# Patient Record
Sex: FEMALE | Race: WHITE | NOT HISPANIC OR LATINO | Employment: FULL TIME | URBAN - METROPOLITAN AREA
[De-identification: names, ages, dates, MRNs, and addresses within clinical notes are randomized per-mention and may not be internally consistent; named-entity substitution may affect disease eponyms.]

---

## 2022-04-18 LAB
D AG BLD QL: NEGATIVE
EXTERNAL ABO: NORMAL
HBV SURFACE AG SER QL: NONREACTIVE
HIV 1+2 AB+HIV1 P24 AG SERPL QL IA: NONREACTIVE
QST CHLAMYDIA TRACHOMATIS RNA, TMA: NEGATIVE
QST NEISSERIA GONORRHOEAE RNA, TMA: NEGATIVE
RPR SER QL: NORMAL
RUBELLA IGG SCREEN: NORMAL
VZV IGG SER-ACNC: 1038 AU/ML (ref ?–99)

## 2022-06-18 LAB — GP B STREP SPEC QL CULT: NORMAL

## 2022-06-27 ENCOUNTER — TELEPHONE (OUTPATIENT)
Dept: SCHEDULING | Facility: CLINIC | Age: 32
End: 2022-06-27
Payer: COMMERCIAL

## 2022-06-27 NOTE — TELEPHONE ENCOUNTER
New Patient Appointment Request     Name of caller Adelia Murry     Reason for Visit: heart palpitations 37 weeks pregnant      Insurance: Personal choice       Insurance ID #: [id number]      Recent Procedures:none     Referred by:OB     Previous Cardiologist name and phone number:pt doesn't remember     Best contact number: [phone]

## 2022-07-05 ENCOUNTER — OFFICE VISIT (OUTPATIENT)
Dept: CARDIOLOGY | Facility: CLINIC | Age: 32
End: 2022-07-05
Payer: COMMERCIAL

## 2022-07-05 VITALS
DIASTOLIC BLOOD PRESSURE: 80 MMHG | SYSTOLIC BLOOD PRESSURE: 110 MMHG | BODY MASS INDEX: 26.8 KG/M2 | HEIGHT: 64 IN | HEART RATE: 90 BPM | WEIGHT: 157 LBS

## 2022-07-05 DIAGNOSIS — R00.2 PALPITATIONS: Primary | ICD-10-CM

## 2022-07-05 PROCEDURE — 99203 OFFICE O/P NEW LOW 30 MIN: CPT | Performed by: INTERNAL MEDICINE

## 2022-07-05 PROCEDURE — 93000 ELECTROCARDIOGRAM COMPLETE: CPT | Performed by: INTERNAL MEDICINE

## 2022-07-05 RX ORDER — MV-MINS 71/IRON/FOLIC NO.1/DHA 28-1-300MG
1 CAPSULE ORAL DAILY
COMMUNITY
End: 2023-03-07

## 2022-07-05 NOTE — LETTER
"July 5, 2022     Lawanda Michael,   [address] [facility], [address] [address]    Patient: ADELIA Murry  YOB: 1990  Date of Visit: 7/5/2022      Dear Dr. Michael:    Thank you for referring ADELIA Murry to me for evaluation. Below are my notes for this consultation.    If you have questions, please do not hesitate to call me. I look forward to following your patient along with you.         Sincerely,        Cleo Dietz MD        CC: No Recipients  Cleo Dietz MD  7/5/2022 12:39 PM  Signed   Cleo Dietz MD, [facility]  Cardiology    [facility]    [facility]  [facility]  [facility] Level  [address] [place] [address]    TEL  [phone]  [facility]     July 5, 2022    Reason for visit: Cardio-obstetrics consultation.    ADELIA Murry is a 31 y.o. female who presents for cardiovascular consultation.    Adelia is currently 38 weeks pregnant presents for evaluation of palpitations.  She reports a longstanding history of palpitations described as a sensation \"like a roller coaster drop.\"  Symptoms lasted a second or so and spontaneously resolved.  Palpitations started many years ago and have been previously evaluated with extensive monitoring which was reportedly unremarkable, although she does recall premature ventricular beats being reported.  She was previously seen by cardiology who recommended no further follow-up given the reportedly benign process.  With pregnancy symptoms have increased, similar roller coaster drops but feel more intense and stronger.  She also notes an occasional increase in her heart rate although remains brief, just a few seconds.  She does also describe a sensation like she is having a few extra beats, sometimes multiple in a row.  Palpitations occur anywhere from 1-15 times per day.  She reports no associated chest pain, dyspnea, dizziness, " lightheadedness, or syncope.  Fluid intake is okay, could be better.    She had COVID-19 in January therefore is taking aspirin as a precaution.    Past Medical History:  1.  Palpitations  2.  Obstetric history:     Past Surgical History:   1. Myringotomy with tube placement    Medications: Aspirin 81 mg daily, prenatal vitamin.    Allergies: Patient has no known allergies.    Social History: Never smoker. Social alcohol use when not pregnant. 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     Family History: Palpitations in father, paternal grandmother and grandfather.    Exam:  Objective   Vitals:    22 1114   BP: 110/80   Pulse: 90     Body mass index is 26.95 kg/m².  Physical Exam  Constitutional:       General: She is not in acute distress.     Appearance: She is well-developed.   HENT:      Head: Normocephalic.      Mouth/Throat:      Mouth: Mucous membranes are not cyanotic.   Eyes:      Conjunctiva/sclera: Conjunctivae normal.   Neck:      Vascular: No carotid bruit or JVD.   Cardiovascular:      Rate and Rhythm: Normal rate and regular rhythm.  No extrasystoles are present.     Pulses:           Carotid pulses are 2+ on the right side and 2+ on the left side.       Radial pulses are 2+ on the right side and 2+ on the left side.      Heart sounds: S1 normal and S2 normal. No murmur heard.    No gallop.   Pulmonary:      Effort: Pulmonary effort is normal. No respiratory distress.      Breath sounds: No wheezing or rales.   Abdominal:      General: Bowel sounds are normal.      Palpations: Abdomen is soft.      Tenderness: There is no abdominal tenderness.   Musculoskeletal:      Right lower leg: No edema.      Left lower leg: No edema.   Lymphadenopathy:      Cervical: No cervical adenopathy.   Skin:     General: Skin is warm and dry.   Neurological:      Mental Status: She is alert.   Psychiatric:         Speech: Speech normal.         Labs:  No results found for: WBC, HGB, PLT, CHOL, TRIG,  HDL, LDL, LDLDIRECT, ALT, AST, NA, K, CREATININE, TSH, INR, HGBA1C, MICROALBUR  No recent labs available for review.    Cardiovascular Studies: None.    ECG from today personally reviewed and discussed with the patient shows sinus rhythm, normal tracing.    Assessment/Plan   Problem List Items Addressed This Visit        Circulatory    Palpitations - Primary     Palpitations as described seem most consistent with premature atrial and/or ventricular beats which have been previously confirmed on extended monitoring.  Suspect increase in ectopy in the setting of pregnancy.  Electrocardiogram in the office today shows sinus rhythm without ectopy, arrhythmia, or preexcitation.  --Call if symptoms increase in frequency/duration and we will plan on repeat Holter monitor for further evaluation, however would hold off for now given she is 38 weeks pregnant and suspect symptoms will improved after delivery.  Call with any clinical change in the interim.  No anticipated complications with labor and/or delivery, no particular precautions recommended at this time.  --Conservative measures discussed: increase fluid intake, include electrolyte containing beverages, avoid excessive caffeine and alcohol intake, ensure adequate sleep and stress management.   --Call if symptoms do not improve postpartum.           Relevant Orders    ECG 12 lead (Completed)             This letter was generated using speech recognition software. Please excuse any typographical errors.    Return if symptoms worsen or fail to improve.        Cleo Dietz MD, FACC

## 2022-07-05 NOTE — ASSESSMENT & PLAN NOTE
Palpitations as described seem most consistent with premature atrial and/or ventricular beats which have been previously confirmed on extended monitoring.  Suspect increase in ectopy in the setting of pregnancy.  Electrocardiogram in the office today shows sinus rhythm without ectopy, arrhythmia, or preexcitation.  --Call if symptoms increase in frequency/duration and we will plan on repeat Holter monitor for further evaluation, however would hold off for now given she is 38 weeks pregnant and suspect symptoms will improved after delivery.  Call with any clinical change in the interim.  No anticipated complications with labor and/or delivery, no particular precautions recommended at this time.  --Conservative measures discussed: increase fluid intake, include electrolyte containing beverages, avoid excessive caffeine and alcohol intake, ensure adequate sleep and stress management.   --Call if symptoms do not improve postpartum.

## 2022-07-05 NOTE — PROGRESS NOTES
" Cleo Dietz MD, [facility]  Cardiology    [facility]    [facility]  [facility] [address] [facility] [facility]  [address] [place] [address]    TEL  [phone]  [facility]     2022    Reason for visit: Cardio-obstetrics consultation.    ADELIA Murry is a 31 y.o. female who presents for cardiovascular consultation.    Adelia is currently 38 weeks pregnant presents for evaluation of palpitations.  She reports a longstanding history of palpitations described as a sensation \"like a roller coaster drop.\"  Symptoms lasted a second or so and spontaneously resolved.  Palpitations started many years ago and have been previously evaluated with extensive monitoring which was reportedly unremarkable, although she does recall premature ventricular beats being reported.  She was previously seen by cardiology who recommended no further follow-up given the reportedly benign process.  With pregnancy symptoms have increased, similar roller coaster drops but feel more intense and stronger.  She also notes an occasional increase in her heart rate although remains brief, just a few seconds.  She does also describe a sensation like she is having a few extra beats, sometimes multiple in a row.  Palpitations occur anywhere from 1-15 times per day.  She reports no associated chest pain, dyspnea, dizziness, lightheadedness, or syncope.  Fluid intake is okay, could be better.    She had COVID-19 in January therefore is taking aspirin as a precaution.    Past Medical History:  1.  Palpitations  2.  Obstetric history:     Past Surgical History:   1. Myringotomy with tube placement    Medications: Aspirin 81 mg daily, prenatal vitamin.    Allergies: Patient has no known allergies.    Social History: Never smoker. Social alcohol use when not pregnant. 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     Family History: Palpitations in father, paternal grandmother and " grandfather.    Exam:  Objective   Vitals:    07/05/22 1114   BP: 110/80   Pulse: 90     Body mass index is 26.95 kg/m².  Physical Exam  Constitutional:       General: She is not in acute distress.     Appearance: She is well-developed.   HENT:      Head: Normocephalic.      Mouth/Throat:      Mouth: Mucous membranes are not cyanotic.   Eyes:      Conjunctiva/sclera: Conjunctivae normal.   Neck:      Vascular: No carotid bruit or JVD.   Cardiovascular:      Rate and Rhythm: Normal rate and regular rhythm.  No extrasystoles are present.     Pulses:           Carotid pulses are 2+ on the right side and 2+ on the left side.       Radial pulses are 2+ on the right side and 2+ on the left side.      Heart sounds: S1 normal and S2 normal. No murmur heard.    No gallop.   Pulmonary:      Effort: Pulmonary effort is normal. No respiratory distress.      Breath sounds: No wheezing or rales.   Abdominal:      General: Bowel sounds are normal.      Palpations: Abdomen is soft.      Tenderness: There is no abdominal tenderness.   Musculoskeletal:      Right lower leg: No edema.      Left lower leg: No edema.   Lymphadenopathy:      Cervical: No cervical adenopathy.   Skin:     General: Skin is warm and dry.   Neurological:      Mental Status: She is alert.   Psychiatric:         Speech: Speech normal.         Labs:  No results found for: WBC, HGB, PLT, CHOL, TRIG, HDL, LDL, LDLDIRECT, ALT, AST, NA, K, CREATININE, TSH, INR, HGBA1C, MICROALBUR  No recent labs available for review.    Cardiovascular Studies: None.    ECG from today personally reviewed and discussed with the patient shows sinus rhythm, normal tracing.    Assessment/Plan   Problem List Items Addressed This Visit        Circulatory    Palpitations - Primary     Palpitations as described seem most consistent with premature atrial and/or ventricular beats which have been previously confirmed on extended monitoring.  Suspect increase in ectopy in the setting of  pregnancy.  Electrocardiogram in the office today shows sinus rhythm without ectopy, arrhythmia, or preexcitation.  --Call if symptoms increase in frequency/duration and we will plan on repeat Holter monitor for further evaluation, however would hold off for now given she is 38 weeks pregnant and suspect symptoms will improved after delivery.  Call with any clinical change in the interim.  No anticipated complications with labor and/or delivery, no particular precautions recommended at this time.  --Conservative measures discussed: increase fluid intake, include electrolyte containing beverages, avoid excessive caffeine and alcohol intake, ensure adequate sleep and stress management.   --Call if symptoms do not improve postpartum.           Relevant Orders    ECG 12 lead (Completed)             This letter was generated using speech recognition software. Please excuse any typographical errors.    Return if symptoms worsen or fail to improve.        Cleo Dietz MD, FACC

## 2022-07-16 ENCOUNTER — HOSPITAL ENCOUNTER (INPATIENT)
Facility: HOSPITAL | Age: 32
LOS: 2 days | Discharge: HOME | End: 2022-07-18
Attending: OBSTETRICS & GYNECOLOGY | Admitting: OBSTETRICS & GYNECOLOGY
Payer: COMMERCIAL

## 2022-07-16 ENCOUNTER — ANESTHESIA (INPATIENT)
Dept: OBSTETRICS AND GYNECOLOGY | Facility: HOSPITAL | Age: 32
End: 2022-07-16
Payer: COMMERCIAL

## 2022-07-16 ENCOUNTER — ANESTHESIA EVENT (INPATIENT)
Dept: OBSTETRICS AND GYNECOLOGY | Facility: HOSPITAL | Age: 32
End: 2022-07-16
Payer: COMMERCIAL

## 2022-07-16 PROBLEM — Z34.90 TERM PREGNANCY: Status: ACTIVE | Noted: 2022-07-16

## 2022-07-16 LAB
ABO + RH BLD: NORMAL
BLD GP AB SCN SERPL QL: NEGATIVE
D AG BLD QL: NEGATIVE
ERYTHROCYTE [DISTWIDTH] IN BLOOD BY AUTOMATED COUNT: 12 % (ref 11.7–14.4)
HBV SURFACE AG SER QL: NONREACTIVE
HCT VFR BLDCO AUTO: 35 % (ref 35–45)
HGB BLD-MCNC: 11.8 G/DL (ref 11.8–15.7)
LABORATORY COMMENT REPORT: NORMAL
MCH RBC QN AUTO: 31.5 PG (ref 28–33.2)
MCHC RBC AUTO-ENTMCNC: 33.7 G/DL (ref 32.2–35.5)
MCV RBC AUTO: 93.3 FL (ref 83–98)
PDW BLD AUTO: 12.1 FL (ref 9.4–12.3)
PLATELET # BLD AUTO: 256 K/UL (ref 150–369)
RBC # BLD AUTO: 3.75 M/UL (ref 3.93–5.22)
SARS-COV-2 RNA RESP QL NAA+PROBE: NEGATIVE
SPECIMEN EXP DATE BLD: NORMAL
T PALLIDUM AB SER QL IF: NONREACTIVE
WBC # BLD AUTO: 9.33 K/UL (ref 3.8–10.5)

## 2022-07-16 PROCEDURE — 12000000 HC ROOM AND CARE MED/SURG

## 2022-07-16 PROCEDURE — 63600000 HC DRUGS/DETAIL CODE: Performed by: OBSTETRICS & GYNECOLOGY

## 2022-07-16 PROCEDURE — 85027 COMPLETE CBC AUTOMATED: CPT | Performed by: OBSTETRICS & GYNECOLOGY

## 2022-07-16 PROCEDURE — 88341 IMHCHEM/IMCYTCHM EA ADD ANTB: CPT | Performed by: OBSTETRICS & GYNECOLOGY

## 2022-07-16 PROCEDURE — 86850 RBC ANTIBODY SCREEN: CPT

## 2022-07-16 PROCEDURE — 86901 BLOOD TYPING SEROLOGIC RH(D): CPT

## 2022-07-16 PROCEDURE — U0003 INFECTIOUS AGENT DETECTION BY NUCLEIC ACID (DNA OR RNA); SEVERE ACUTE RESPIRATORY SYNDROME CORONAVIRUS 2 (SARS-COV-2) (CORONAVIRUS DISEASE [COVID-19]), AMPLIFIED PROBE TECHNIQUE, MAKING USE OF HIGH THROUGHPUT TECHNOLOGIES AS DESCRIBED BY CMS-2020-01-R: HCPCS | Performed by: OBSTETRICS & GYNECOLOGY

## 2022-07-16 PROCEDURE — 37000005 HC ANESTHESIA EPIDURAL/SPINAL

## 2022-07-16 PROCEDURE — 25000000 HC PHARMACY GENERAL: Performed by: ANESTHESIOLOGY

## 2022-07-16 PROCEDURE — 88312 SPECIAL STAINS GROUP 1: CPT | Performed by: OBSTETRICS & GYNECOLOGY

## 2022-07-16 PROCEDURE — 86780 TREPONEMA PALLIDUM: CPT | Performed by: OBSTETRICS & GYNECOLOGY

## 2022-07-16 PROCEDURE — 87340 HEPATITIS B SURFACE AG IA: CPT | Performed by: OBSTETRICS & GYNECOLOGY

## 2022-07-16 PROCEDURE — 36415 COLL VENOUS BLD VENIPUNCTURE: CPT | Performed by: OBSTETRICS & GYNECOLOGY

## 2022-07-16 PROCEDURE — 27200130 HC EPIDURAL ANES TRAY

## 2022-07-16 PROCEDURE — 72000011 HC VAGINAL DELIVERY LEVEL 1

## 2022-07-16 PROCEDURE — 63700000 HC SELF-ADMINISTRABLE DRUG: Performed by: OBSTETRICS & GYNECOLOGY

## 2022-07-16 PROCEDURE — 0HQ9XZZ REPAIR PERINEUM SKIN, EXTERNAL APPROACH: ICD-10-PCS | Performed by: OBSTETRICS & GYNECOLOGY

## 2022-07-16 PROCEDURE — 88342 IMHCHEM/IMCYTCHM 1ST ANTB: CPT | Performed by: OBSTETRICS & GYNECOLOGY

## 2022-07-16 RX ORDER — FENTANYL/ROPIVACAINE/NS/PF 2-1500 MCG
PREFILLED PUMP RESERVOIR INJECTION CONTINUOUS
Status: DISCONTINUED | OUTPATIENT
Start: 2022-07-16 | End: 2022-07-16

## 2022-07-16 RX ORDER — DIBUCAINE 1 %
1 OINTMENT (GRAM) TOPICAL AS NEEDED
Status: DISCONTINUED | OUTPATIENT
Start: 2022-07-16 | End: 2022-07-18 | Stop reason: HOSPADM

## 2022-07-16 RX ORDER — TRANEXAMIC ACID 10 MG/ML
1000 INJECTION, SOLUTION INTRAVENOUS ONCE AS NEEDED
Status: CANCELLED | OUTPATIENT
Start: 2022-07-16

## 2022-07-16 RX ORDER — ALUMINUM HYDROXIDE, MAGNESIUM HYDROXIDE, AND SIMETHICONE 1200; 120; 1200 MG/30ML; MG/30ML; MG/30ML
30 SUSPENSION ORAL EVERY 4 HOURS PRN
Status: DISCONTINUED | OUTPATIENT
Start: 2022-07-16 | End: 2022-07-18 | Stop reason: HOSPADM

## 2022-07-16 RX ORDER — MISOPROSTOL 200 UG/1
1000 TABLET ORAL ONCE AS NEEDED
Status: CANCELLED | OUTPATIENT
Start: 2022-07-16

## 2022-07-16 RX ORDER — IBUPROFEN 600 MG/1
600 TABLET ORAL EVERY 6 HOURS PRN
Status: DISCONTINUED | OUTPATIENT
Start: 2022-07-16 | End: 2022-07-18 | Stop reason: HOSPADM

## 2022-07-16 RX ORDER — ONDANSETRON HYDROCHLORIDE 2 MG/ML
4 INJECTION, SOLUTION INTRAVENOUS EVERY 8 HOURS PRN
Status: DISCONTINUED | OUTPATIENT
Start: 2022-07-16 | End: 2022-07-18 | Stop reason: HOSPADM

## 2022-07-16 RX ORDER — CALCIUM CARBONATE 200(500)MG
500 TABLET,CHEWABLE ORAL EVERY 4 HOURS PRN
Status: DISCONTINUED | OUTPATIENT
Start: 2022-07-16 | End: 2022-07-18 | Stop reason: HOSPADM

## 2022-07-16 RX ORDER — OXYCODONE HYDROCHLORIDE 5 MG/1
5 TABLET ORAL EVERY 4 HOURS PRN
Status: DISCONTINUED | OUTPATIENT
Start: 2022-07-16 | End: 2022-07-18 | Stop reason: HOSPADM

## 2022-07-16 RX ORDER — OXYTOCIN/0.9 % SODIUM CHLORIDE 40/1000ML
500 PLASTIC BAG, INJECTION (ML) INTRAVENOUS ONCE
Status: CANCELLED | OUTPATIENT
Start: 2022-07-16 | End: 2022-07-16

## 2022-07-16 RX ORDER — IBUPROFEN 600 MG/1
600 TABLET ORAL ONCE
Status: COMPLETED | OUTPATIENT
Start: 2022-07-16 | End: 2022-07-16

## 2022-07-16 RX ORDER — TRANEXAMIC ACID 10 MG/ML
INJECTION, SOLUTION INTRAVENOUS
Status: DISPENSED
Start: 2022-07-16 | End: 2022-07-16

## 2022-07-16 RX ORDER — SODIUM CHLORIDE, SODIUM LACTATE, POTASSIUM CHLORIDE, CALCIUM CHLORIDE 600; 310; 30; 20 MG/100ML; MG/100ML; MG/100ML; MG/100ML
125 INJECTION, SOLUTION INTRAVENOUS CONTINUOUS
Status: DISCONTINUED | OUTPATIENT
Start: 2022-07-16 | End: 2022-07-16

## 2022-07-16 RX ORDER — ONDANSETRON 4 MG/1
4 TABLET, ORALLY DISINTEGRATING ORAL EVERY 8 HOURS PRN
Status: DISCONTINUED | OUTPATIENT
Start: 2022-07-16 | End: 2022-07-18 | Stop reason: HOSPADM

## 2022-07-16 RX ORDER — LIDOCAINE HYDROCHLORIDE AND EPINEPHRINE 15; 5 MG/ML; UG/ML
INJECTION, SOLUTION EPIDURAL
Status: COMPLETED | OUTPATIENT
Start: 2022-07-16 | End: 2022-07-16

## 2022-07-16 RX ORDER — EPHEDRINE SULFATE/0.9% NACL/PF 50 MG/5 ML
10 SYRINGE (ML) INTRAVENOUS ONCE
Status: COMPLETED | OUTPATIENT
Start: 2022-07-16 | End: 2022-07-16

## 2022-07-16 RX ORDER — METHYLERGONOVINE MALEATE 0.2 MG/ML
200 INJECTION INTRAVENOUS ONCE AS NEEDED
Status: CANCELLED | OUTPATIENT
Start: 2022-07-16

## 2022-07-16 RX ORDER — LIDOCAINE HYDROCHLORIDE 10 MG/ML
0-30 INJECTION, SOLUTION EPIDURAL; INFILTRATION; INTRACAUDAL; PERINEURAL ONCE AS NEEDED
Status: CANCELLED | OUTPATIENT
Start: 2022-07-16

## 2022-07-16 RX ORDER — ACETAMINOPHEN 325 MG/1
650 TABLET ORAL EVERY 4 HOURS PRN
Status: DISCONTINUED | OUTPATIENT
Start: 2022-07-16 | End: 2022-07-18 | Stop reason: HOSPADM

## 2022-07-16 RX ORDER — OXYTOCIN 10 [USP'U]/ML
10 INJECTION, SOLUTION INTRAMUSCULAR; INTRAVENOUS ONCE AS NEEDED
Status: CANCELLED | OUTPATIENT
Start: 2022-07-16

## 2022-07-16 RX ORDER — CARBOPROST TROMETHAMINE 250 UG/ML
250 INJECTION, SOLUTION INTRAMUSCULAR ONCE AS NEEDED
Status: CANCELLED | OUTPATIENT
Start: 2022-07-16

## 2022-07-16 RX ORDER — OXYTOCIN/0.9 % SODIUM CHLORIDE 40/1000ML
PLASTIC BAG, INJECTION (ML) INTRAVENOUS CONTINUOUS
Status: CANCELLED | OUTPATIENT
Start: 2022-07-16 | End: 2022-07-16

## 2022-07-16 RX ORDER — AMOXICILLIN 250 MG
1 CAPSULE ORAL 2 TIMES DAILY
Status: DISCONTINUED | OUTPATIENT
Start: 2022-07-16 | End: 2022-07-18 | Stop reason: HOSPADM

## 2022-07-16 RX ADMIN — SODIUM CHLORIDE, POTASSIUM CHLORIDE, SODIUM LACTATE AND CALCIUM CHLORIDE 1000 ML: 600; 310; 30; 20 INJECTION, SOLUTION INTRAVENOUS at 02:00

## 2022-07-16 RX ADMIN — Medication 10 ML: at 04:16

## 2022-07-16 RX ADMIN — LIDOCAINE HYDROCHLORIDE,EPINEPHRINE BITARTRATE 3 ML: 15; .005 INJECTION, SOLUTION EPIDURAL; INFILTRATION; INTRACAUDAL; PERINEURAL at 04:10

## 2022-07-16 RX ADMIN — Medication 50 ML: at 04:01

## 2022-07-16 RX ADMIN — Medication 10 MG: at 04:33

## 2022-07-16 RX ADMIN — SENNOSIDES AND DOCUSATE SODIUM 1 TABLET: 50; 8.6 TABLET ORAL at 19:48

## 2022-07-16 RX ADMIN — IBUPROFEN 600 MG: 600 TABLET, FILM COATED ORAL at 19:48

## 2022-07-16 RX ADMIN — ACETAMINOPHEN 650 MG: 325 TABLET ORAL at 23:32

## 2022-07-16 RX ADMIN — ACETAMINOPHEN 650 MG: 325 TABLET ORAL at 13:04

## 2022-07-16 RX ADMIN — IBUPROFEN 600 MG: 600 TABLET, FILM COATED ORAL at 09:57

## 2022-07-16 ASSESSMENT — PAIN SCALES - GENERAL: PAIN_LEVEL: 4

## 2022-07-16 ASSESSMENT — PATIENT HEALTH QUESTIONNAIRE - PHQ9: SUM OF ALL RESPONSES TO PHQ9 QUESTIONS 1 & 2: 0

## 2022-07-16 NOTE — PLAN OF CARE
Problem: Adult Inpatient Plan of Care  Goal: Plan of Care Review  Outcome: Progressing  Goal: Patient-Specific Goal (Individualized)  Outcome: Progressing  Goal: Absence of Hospital-Acquired Illness or Injury  Outcome: Progressing  Goal: Optimal Comfort and Wellbeing  Outcome: Progressing  Goal: Readiness for Transition of Care  Outcome: Progressing     Problem: Breastfeeding  Goal: Effective Breastfeeding  Outcome: Progressing     Problem: Adjustment to Role Transition (Postpartum Vaginal Delivery)  Goal: Successful Maternal Role Transition  Outcome: Progressing     Problem: Bleeding (Postpartum Vaginal Delivery)  Goal: Hemostasis  Outcome: Progressing     Problem: Infection (Postpartum Vaginal Delivery)  Goal: Absence of Infection Signs and Symptoms  Outcome: Progressing     Problem: Pain (Postpartum Vaginal Delivery)  Goal: Acceptable Pain Control  Outcome: Progressing     Problem: Urinary Retention (Postpartum Vaginal Delivery)  Goal: Effective Urinary Elimination  Outcome: Progressing

## 2022-07-16 NOTE — H&P
History and Physical     CC: leakage of fluid    HPI:  31 y.o.  at 40w0d presents with leakage of clear fluid since 9:00 PM tonight.  Pt states she had a gush of fluid which continues to leak out.  States the baby has been moving regularly since this evening.  Denies any vaginal bleeding or painful contractions.      ROS: As above, otherwise negative    Complications:  Covid in pregnancy - 2022    Prenatal Labs: A neg/GBS neg    Obhx:   OB History    Para Term  AB Living   1             SAB IAB Ectopic Multiple Live Births                  # Outcome Date GA Lbr Audi/2nd Weight Sex Delivery Anes PTL Lv   1 Current                Gynehx: denies    Medhx: No past medical history on file.    Surghx: No past surgical history on file.    Meds:   No current facility-administered medications on file prior to encounter.     No current outpatient medications on file prior to encounter.       All: No Known Allergies    Sochx:   Social History     Socioeconomic History   • Marital status:      Spouse name: Not on file   • Number of children: Not on file   • Years of education: Not on file   • Highest education level: Not on file   Occupational History   • Not on file   Tobacco Use   • Smoking status: Never Smoker   • Smokeless tobacco: Never Used   Vaping Use   • Vaping Use: Never used   Substance and Sexual Activity   • Alcohol use: Not Currently   • Drug use: Never   • Sexual activity: Yes   Other Topics Concern   • Not on file   Social History Narrative   • Not on file     Social Determinants of Health     Financial Resource Strain: Not on file   Food Insecurity: Not on file   Transportation Needs: Not on file   Physical Activity: Not on file   Stress: Not on file   Social Connections: Not on file   Intimate Partner Violence: Not on file   Housing Stability: Not on file       Famhx: No family history on file.    Physical Exam:    Visit Vitals  /75   Pulse (!) 122   Temp 36.8 °C (98.3 °F)  "(Oral)   Resp 20   Ht 1.626 m (5' 4\")   Wt 73.5 kg (162 lb)   LMP 10/08/2021   SpO2 100%   Breastfeeding Yes   BMI 27.81 kg/m²     AAOx3, NAD  CV:RRR  L: No labored respirations  Abd: Gravid, non-tender  Ex: No edema, NT    SVE: 3 cm per nursing    FHT:  150/moderate variability/+accels/no decels Cat 1  TOCO: 2-3    CBC Results       22     0124    WBC 9.33    RBC 3.75    HGB 11.8    HCT 35.0    MCV 93.3    MCH 31.5    MCHC 33.7              A/P: 31 y.o.  at 40w0d presents in early labor, requests epidural, GBS neg    Admit to L&D  CBC, T&S, RPR, COVID  CLD, IVF  CFM, TOCO  Epidural PRN  Expectant management    Alexus Carlson,     Obstetrics and Gynecology  [facility]  In hospital pager #[phone]  Office # [phone]                      "

## 2022-07-16 NOTE — LACTATION NOTE
Initial lactation visit. DDV. Reviewed breastfeeding basics, normal  behavior, and importance of frequent, on demand/cue-based feeds at least 8-12x/day. Assisted with positioning/latch strategies and baby was able to latch deeply with sustained suck/swallow rhythm. Parents counseled on ways to tell baby is getting adequate volumes at the breast. Educational pamphlet reviewed. Made aware of LC availability and encouraged to call for assistance as needed.

## 2022-07-16 NOTE — PROGRESS NOTES
"Intrapartum Note:    S:  Called to bedside to evaluate vaginal bleeding, per nursing pt is now complete.  Patient without complaints at this time. Pt comfortable with epidural. +FM    O:   Visit Vitals  /75   Pulse (!) 122   Temp 36.8 °C (98.3 °F) (Oral)   Resp 20   Ht 1.626 m (5' 4\")   Wt 73.5 kg (162 lb)   LMP 10/08/2021   SpO2 100%   Breastfeeding Yes   BMI 27.81 kg/m²     FHT:  150/moderate variability/+accels/variable decels CAT 2  TOCO: q2  SVE: complete/+2    A/P: 31 y.o.  at 40w1d in active labor, GBS neg   MWB: epidural infusing, vaginal bleeding bright red, pt pushing with excellent maternal effort  FWB: Cat 2  Labor: Will continue to push, expect , NICU in the room    Alexus Carlson DO    Obstetrics and Gynecology  [facility]  In hospital pager #[phone]  Office # [phone]      "

## 2022-07-16 NOTE — L&D DELIVERY NOTE
OB Vaginal Delivery Note    Delivery:2022 at 6:19 AM   Patient:IRIS Murry  :1990    Review the Delivery Report for details.     Delivery Details    Pre-Op Diagnosis: 1. 31 y.o.  intrauterine pregnancy at 40w1d with leyva gestation.  2. Vaginal bleeding in labor, suspect placental abruption  3. Covid in pregnancy   Post-Op Diagnosis: 1. Same and delivered   Delivery Clinician: Alexus Carlson    Delivery Assist;Delivery Nurse;Nursery Nurse;Neonatologist;Delivery Nurse  ;Abril Alford;Arabella Naylor;Abril Marroquin;Dunia Fuller    Delivery Type: Vaginal, Spontaneous    Labor Complications: None    EBL:   300 mL   Anesthesia Type: Epidural    Placenta Delivery Spontaneous    Placenta Disposition: pathology    Additional Specimens Cord Blood      INFANT INFORMATION  Time of Birth:6:19 AM   Presentation: Vertex   Position:Left ,Occiput ,Anterior   Cord:  ,Complications:    Sex: female   [place] Weight:       1 Minute 5 Minute 10 Minute   Apgar Totals:              Information for the patient's :  Robert Murry, Girl [[id number]]      Cord Gas     None           Delivery Details:    IRIS Murry is a 31 y.o.  at 40w1d gestation who presented to the hospital for Term pregnancy [Z34.90].  Her labor was spontaneous.  The patient progressed to fully dilated and pushed three times.  A viable  female  infant was delivered by Vaginal, Spontaneous  from Left ,Occiput ,Anterior .  The anterior and posterior shoulders delivered without difficulty followed by the remainder of the infant. A spontaneous cry was heard, and the infant appeared to be moving all 4 extremities. The cord was clamped and cut with   3-v noted.  The placenta delivered spontaneously shortly thereafter and pitocin started.  Fundal massage was performed and the fundus was found to be firm, and lochia was within normal limits. The perineum, vagina, cervix were inspected, and the  following lacerations were noted:      Episiotomy:     Lacerations:   Perineal: 1st  Repaired: Yes     Periurethral: right   Repaired: Yes    Labial:   Repaired:     Sulcus:   Repaired:     Vaginal:   Repaired:     Cervical:    Repaired:        Any lacerations were repaired in the usual fashion using 3-0 Synthetic Suture . Excellent hemostasis was noted. At the completion of the case, sponge and needle counts were correct. The infant and patient were left in the delivery room in stable condition.     Attending Attestation: I was present and scrubbed for the entire procedure.    Alexus Carlson DO    Obstetrics and Gynecology  [facility]  In [facility] pager #[phone]  Office # [phone]

## 2022-07-16 NOTE — PLAN OF CARE
Initial lactation consult. Feeding observed. Education provided. Continue to monitor.     Problem: Breastfeeding  Goal: Effective Breastfeeding  Outcome: Progressing  Intervention: Promote Effective Breastfeeding  Flowsheets (Taken 7/16/2022 1642)  Breastfeeding Assistance:   assisted with positioning   feeding cue recognition promoted   feeding on demand promoted   feeding session observed   infant suck/swallow verified   infant latch-on verified   support offered   hand expression verified  Parent/Child Attachment Promotion:   cue recognition promoted   face-to-face positioning promoted   interaction encouraged   positive reinforcement provided   participation in care promoted   strengths emphasized   skin-to-skin contact encouraged  Intervention: Support Exclusive Breastfeeding Success  Flowsheets (Taken 7/16/2022 1642)  Breastfeeding Support:   encouragement provided   lactation counseling provided

## 2022-07-16 NOTE — ANESTHESIA PROCEDURE NOTES
Epidural Block    Patient location during procedure: OB  Start time: 7/16/2022 4:01 AM  End time: 7/16/2022 4:20 AM  Reason for block: labor analgesia requested by patient and obstetrician  Staffing  Performed: anesthesiologist   Anesthesiologist: Kieran May MD  Preanesthetic Checklist  Completed: patient identified, surgical consent, pre-op evaluation, timeout performed, IV checked, risks and benefits discussed, monitors and equipment checked and sterile field maintained during procedure  Needle  Needle type: Yessy   Needle gauge: 17 G  Needle length: 3.5 in  Catheter type: Single orifice  Catheter size: 19 G  Test dose: negative and lidocaine 1.5% with epinephrine 1-to-200,000  Additional Notes  Procedure well tolerated. Vital signs stable. No paresthesia.  Analgesia satisfactory. Patients nurse to notify anesthesiologist if hemodynamically unstable.    Medications Administered -   lidocaine 1.5%-EPINEPHrine 1:200,000 PF (XYLOCAINE W/EPI) injection - epidural   3 mL - 7/16/2022 4:10:00 AM

## 2022-07-16 NOTE — ANESTHESIOLOGIST PRE-PROCEDURE ATTESTATION
Pre-Procedure Patient Identification:  I am the Primary Anesthesiologist and have identified the patient on 07/16/22 at 3:52 AM.   I have confirmed the procedure(s) will be performed by the following surgeon/proceduralist * Surgery not found *.

## 2022-07-16 NOTE — ANESTHESIA POSTPROCEDURE EVALUATION
Patient: IRIS Murry    Procedure Summary     Date: 07/16/22 Room / Location:     Anesthesia Start: 0401 Anesthesia Stop: 0619    Procedure: Labor Analgesia Diagnosis:     Scheduled Providers:  Responsible Provider: Kieran May MD    Anesthesia Type: labor epidural ASA Status: 2          Anesthesia Type: labor epidural  PACU Vitals    No data found in the last 10 encounters.           Anesthesia Post Evaluation    Pain score: 4  Pain management: satisfactory to patient  Mode of pain management: IV medication  Patient location during evaluation: PACU  Patient participation: complete - patient participated  Level of consciousness: awake  Cardiovascular status: acceptable  Airway Patency: adequate  Respiratory status: acceptable  Hydration status: stable  Anesthetic complications: no

## 2022-07-17 LAB
ABO + RH BLD: NORMAL
BLD GP AB SCN SERPL QL: NEGATIVE
D AG BLD QL: NEGATIVE
FETAL CELL SCN BLD QL ROSETTE: NEGATIVE
LABORATORY COMMENT REPORT: NORMAL

## 2022-07-17 PROCEDURE — 36415 COLL VENOUS BLD VENIPUNCTURE: CPT | Performed by: OBSTETRICS & GYNECOLOGY

## 2022-07-17 PROCEDURE — 12000000 HC ROOM AND CARE MED/SURG

## 2022-07-17 PROCEDURE — 86901 BLOOD TYPING SEROLOGIC RH(D): CPT

## 2022-07-17 PROCEDURE — 63700000 HC SELF-ADMINISTRABLE DRUG: Performed by: OBSTETRICS & GYNECOLOGY

## 2022-07-17 RX ORDER — NYSTATIN 100000 [USP'U]/G
OINTMENT TOPICAL AS NEEDED
Status: DISCONTINUED | OUTPATIENT
Start: 2022-07-17 | End: 2022-07-18 | Stop reason: HOSPADM

## 2022-07-17 RX ADMIN — DIBUCAINE 1% 1 APPLICATION.: 1 CREAM TOPICAL at 19:52

## 2022-07-17 RX ADMIN — IBUPROFEN 600 MG: 600 TABLET, FILM COATED ORAL at 17:22

## 2022-07-17 RX ADMIN — PRENATAL VIT W/ FE FUMARATE-FA TAB 27-0.8 MG 1 TABLET: 27-0.8 TAB at 10:55

## 2022-07-17 RX ADMIN — BENZOCAINE AND LEVOMENTHOL: 200; 5 SPRAY TOPICAL at 19:52

## 2022-07-17 RX ADMIN — SENNOSIDES AND DOCUSATE SODIUM 1 TABLET: 50; 8.6 TABLET ORAL at 19:51

## 2022-07-17 RX ADMIN — IBUPROFEN 600 MG: 600 TABLET, FILM COATED ORAL at 06:01

## 2022-07-17 RX ADMIN — ACETAMINOPHEN 650 MG: 325 TABLET ORAL at 19:52

## 2022-07-17 NOTE — PLAN OF CARE
Problem: Adult Inpatient Plan of Care  Goal: Plan of Care Review  Outcome: Progressing  Flowsheets (Taken 7/17/2022 8469)  Progress: improving  Plan of Care Reviewed With:   patient   spouse  Goal: Patient-Specific Goal (Individualized)  Outcome: Progressing  Goal: Absence of Hospital-Acquired Illness or Injury  Outcome: Progressing  Goal: Optimal Comfort and Wellbeing  Outcome: Progressing  Goal: Readiness for Transition of Care  Outcome: Progressing     Problem: Breastfeeding  Goal: Effective Breastfeeding  Outcome: Progressing     Problem: Adjustment to Role Transition (Postpartum Vaginal Delivery)  Goal: Successful Maternal Role Transition  Outcome: Progressing     Problem: Bleeding (Postpartum Vaginal Delivery)  Goal: Hemostasis  Outcome: Progressing     Problem: Pain (Postpartum Vaginal Delivery)  Goal: Acceptable Pain Control  Outcome: Progressing   Plan of Care Review  Plan of Care Reviewed With: patient, spouse  Progress: improving

## 2022-07-17 NOTE — LACTATION NOTE
Lactation rounds. Pt reports breastfeeding going well but she is experiencing some nipple soreness. Assisted with positioning/latching and baby was able to latch deeply with sustained suck/swallow rhythm. Mom reports improvement with a deeper latch. Went over deep latch technique and importance of holding baby in close to the breast. Reviewed importance of feeding baby on demand at least every 2-3 hours. Questions answered and support provided. Encouraged to call for LC assistance as needed.

## 2022-07-18 VITALS
RESPIRATION RATE: 18 BRPM | TEMPERATURE: 97.4 F | HEART RATE: 119 BPM | OXYGEN SATURATION: 97 % | WEIGHT: 162 LBS | HEIGHT: 64 IN | DIASTOLIC BLOOD PRESSURE: 85 MMHG | BODY MASS INDEX: 27.66 KG/M2 | SYSTOLIC BLOOD PRESSURE: 139 MMHG

## 2022-07-18 PROCEDURE — 63700000 HC SELF-ADMINISTRABLE DRUG: Performed by: OBSTETRICS & GYNECOLOGY

## 2022-07-18 RX ORDER — IBUPROFEN 600 MG/1
600 TABLET ORAL EVERY 6 HOURS PRN
Qty: 45 TABLET | Refills: 0 | Status: SHIPPED | OUTPATIENT
Start: 2022-07-18 | End: 2022-12-08 | Stop reason: ALTCHOICE

## 2022-07-18 RX ORDER — AMOXICILLIN 250 MG
1 CAPSULE ORAL 2 TIMES DAILY
Qty: 60 TABLET | Refills: 0 | Status: SHIPPED | OUTPATIENT
Start: 2022-07-18 | End: 2023-03-07 | Stop reason: ALTCHOICE

## 2022-07-18 RX ADMIN — SENNOSIDES AND DOCUSATE SODIUM 1 TABLET: 50; 8.6 TABLET ORAL at 08:40

## 2022-07-18 RX ADMIN — PRENATAL VIT W/ FE FUMARATE-FA TAB 27-0.8 MG 1 TABLET: 27-0.8 TAB at 08:40

## 2022-07-18 RX ADMIN — ACETAMINOPHEN 650 MG: 325 TABLET ORAL at 08:40

## 2022-07-18 RX ADMIN — IBUPROFEN 600 MG: 600 TABLET, FILM COATED ORAL at 03:43

## 2022-07-18 NOTE — PROGRESS NOTES
"POST PARTUM NOTE    PPD#2    S:  Patient seen and examined.  The patient has no complaints at this time.  Pain is well controlled with Motrin.  Tolerated general diet.  Denies n/v/d/f/c. Lochia decreasing.  Breastfeeding infant well.      Patient slightly uncomfortable with cluster feeding and fussy baby.    O:    Visit Vitals  /85 (BP Location: Left upper arm, Patient Position: Sitting)   Pulse (!) 119   Temp 36.3 °C (97.4 °F) (Oral)   Resp 18   Ht 1.626 m (5' 4\")   Wt 73.5 kg (162 lb)   LMP 10/08/2021   SpO2 97%   Breastfeeding Yes   BMI 27.81 kg/m²     Physical Exam:  AAOx3, NAD  Abd: soft, appropriately tender to palpation  Fundus: firm below umbilicus and nontender  Ex: non-tender, no cyanosis    A/P: 31 y.o.  s/p   PPD#2    DISPO:Continue general postpartum care  Discharge home today with scripts and instructions    Sally Agudelo MD  Obstetrics and Gynecology   [facility]   Pager [phone]    "

## 2022-07-18 NOTE — LACTATION NOTE
Baby crying in father's arms, mother distressed. Has been feeding baby a long time, does not know if she is hungry or comfort nursing. Reports baby was up all night. Attempt to put baby back to breast, is very upset and crying, will not latch even with nipple in front of mouth. Mother tearful. Have baby suckle finger, calms. Will not suckle when returned to breast, offer to hand express and spoon feed while baby suckles finger. Hand express from left breast and have mother participate, collect from spoon three times, approximately 3mls each. Baby still cueing, return to right breast, baby latches but is painful. Show how to adjust baby's chin while latched, mother reports immediate improvement in comfort. Baby calms. Show father how to adjust at chin, offer suggestions for other ways to support breastfeeding relationship. Review breastfeeding booklet and other resources available after discharge. Baby has been stooling and voiding well. Parents have no further questions.

## 2022-07-18 NOTE — DISCHARGE INSTRUCTIONS
POSTPARTUM DISCHARGE INSTRUCTIONS      If you had a vaginal delivery: Call for postpartum  appointment with any of our physicians in 6wks.   If you had a  Section: call for a 6 weeks postpartum visit.     Activities/Restrictions:    -No driving for 7-14 days.  No driving if taking Norco for pain.   -No baths or swimming for 6 weeks.  Showers only.   -No tampons, douching, intercourse for 6 weeks   -No heavy lifting more than the baby for 6 weeks.    -No heavy exercise for 6 weeks.      Medications:   -Please resume prenatal vitamins if you are breast feeding  -Colace 100mg take one tablet by mouth daily as needed for constipation. This is an over-the-counter medication.   -For hemorrhoids, use Tucks pads, Preparation H, Proctofoam as needed.  -For cracked/sore nipples you may use Lanolin cream.    Please resume your general diet.     To help with and episiotomy or vaginal laceration disomfort, you may:  1. Apply cold packs or chilled witch-werner pads to the area  2. Take sitz baths (soaking in a few inches of warm water will help)  3. Use over the counter Dermaplast spray  4. Apply warm water to the area after urination using a squeeze water bottle  5. Always wipe from front to back to prevent infection    If you had a  section:  1. Keep you incision clean and dry.  2. You may let the water run over your incision in the shower but do not directly scrub the incision.  3. Do not apply creams or lotions to the incision  4. Your steri-strips may fall off on their own, otherwise remove them in the shower after 7 days    Please call the office if you have:  1. Heavy vaginal bleeding (soaking more than one pad per hour or passing clots larger than an egg)  2. Increasing redness or swelling at or near your incision or episiotomy site  3. Inability to tolerate food or drink without vomiting  4. Opening, bleeding, discharge or separation of your incision or episiotomy site  5. Foul smelling discharge  6.  "Chills or fever over 100.4 degrees Fahrenheit  7. Increasing pain (not relieved by pain medication)  8. Headache unrelieved by \"pain meds\"  9. New calf pain especially if only on one side  10. Unrelieved feelings of:  Inability to cope  Sadness  Anxiety  Lack of interest in baby  Insomnia  Crying        "

## 2022-07-18 NOTE — PLAN OF CARE
Problem: Adult Inpatient Plan of Care  Goal: Plan of Care Review  Outcome: Progressing  Flowsheets (Taken 7/18/2022 5895)  Progress: improving  Plan of Care Reviewed With:   patient   spouse  Goal: Patient-Specific Goal (Individualized)  Outcome: Progressing  Goal: Absence of Hospital-Acquired Illness or Injury  Outcome: Progressing  Goal: Optimal Comfort and Wellbeing  Outcome: Progressing  Goal: Readiness for Transition of Care  Outcome: Progressing     Problem: Breastfeeding  Goal: Effective Breastfeeding  Outcome: Progressing     Problem: Adjustment to Role Transition (Postpartum Vaginal Delivery)  Goal: Successful Maternal Role Transition  Outcome: Progressing     Problem: Bleeding (Postpartum Vaginal Delivery)  Goal: Hemostasis  Outcome: Progressing     Problem: Pain (Postpartum Vaginal Delivery)  Goal: Acceptable Pain Control  Outcome: Progressing   Plan of Care Review  Plan of Care Reviewed With: patient, spouse  Progress: improving

## 2022-07-18 NOTE — DISCHARGE SUMMARY
Inpatient Discharge Summary    BRIEF OVERVIEW  Admitting Provider: Lawanda Michael DO  Discharge Provider: Lawanda Michael,*  Primary Care Physician at Discharge: Pt States, No Pcp [phone]     Admission Date: 2022     Discharge Date: 2022    Primary Discharge Diagnosis  Term pregnancy    Secondary Discharge Diagnosis      Discharge Disposition  Home     Discharge Medications     Medication List      CONTINUE taking these medications    aspirin 81 mg capsule  81 mg.  Dose: 81 mg     ZATEAN-PN PLUS 28-1-300 mg capsule  Take 1 capsule by mouth daily.  Dose: 1 capsule  Generic drug: mv-mins 71-iron-folic no.1-dha            Active Issues Requiring Follow-up  Issue: ppd visit  Responsible Individual: MLW  What is Needed: appt  Follow-up Appointments Arranged: No     Outpatient Follow-Up  Encounter Information    This patient does not currently have any appointments scheduled.         Test Results Pending at Discharge  Pending Labs     Order Current Status    Pathology Tissue Exam Placenta; No In process          DETAILS OF HOSPITAL STAY    Presenting Problem/History of Present Illness  Term pregnancy [Z34.90]      Hospital Course/Operative Procedures Performed    Consults: none  Procedures:   Pertinent Test Results:   CBC Results       22     0124    WBC 9.33    RBC 3.75    HGB 11.8    HCT 35.0    MCV 93.3    MCH 31.5    MCHC 33.7                  Alexus Carlson DO    Obstetrics and Gynecology  [facility]  In [facility] pager #[phone]  Office # [phone]

## 2022-07-18 NOTE — PROGRESS NOTES
"POST PARTUM NOTE    PPD#1     S:  The patient has no complaints at this time.  Pain is well controlled with Motrin.  Tolerated general diet.  Denies n/v/d. +flatus/denies bowel movement.  +ambulation.  Lochia decreasing.  Breastfeeding infant well.      O:    Visit Vitals  /85 (BP Location: Left upper arm, Patient Position: Lying)   Pulse 93   Temp 36.9 °C (98.4 °F) (Oral)   Resp 16   Ht 1.626 m (5' 4\")   Wt 73.5 kg (162 lb)   LMP 10/08/2021   SpO2 97%   Breastfeeding Yes   BMI 27.81 kg/m²     Physical Exam:  AAOx3, NAD  CV:RR  L: No respiratory distress  Abd: soft, appropriately tender to palpation, fundus firm below umbilicus per nursing  Ex: No edema, non-tender, no cyanosis    Lab Results   Component Value Date    WBC 9.33 2022    HGB 11.8 2022    HCT 35.0 2022    MCV 93.3 2022     2022       A/P: 31 y.o.  s/p   PPD#1    FEN: GD,HLIV  HEME: Stable Hg  PAIN: Motrin PRN  Gi: Senokot prn  Gu: voiding  PPX: ambulation encouraged  DISPO: Continue routine post partum care, plan for discharge home tomorrow     Alexus Carlson DO    Obstetrics and Gynecology  [facility]  In hospital pager #[phone]  Office # [phone]      "

## 2022-07-20 LAB
CASE RPRT: NORMAL
IMMUNE STAIN STUDY: NORMAL
PATH REPORT.FINAL DX SPEC: NORMAL
PATH REPORT.FINAL DX SPEC: NORMAL
PATH REPORT.GROSS SPEC: NORMAL
PATH REPORT.MICROSCOPIC SPEC OTHER STN: NORMAL

## 2022-11-22 ENCOUNTER — TELEPHONE (OUTPATIENT)
Dept: CARDIOLOGY | Facility: CLINIC | Age: 32
End: 2022-11-22
Payer: COMMERCIAL

## 2022-11-22 ENCOUNTER — TELEPHONE (OUTPATIENT)
Dept: SCHEDULING | Facility: CLINIC | Age: 32
End: 2022-11-22
Payer: COMMERCIAL

## 2022-11-22 DIAGNOSIS — R00.2 PALPITATIONS: Primary | ICD-10-CM

## 2022-11-22 NOTE — TELEPHONE ENCOUNTER
Order in for 24h holter monitor  We are running low on holters; [place] -- if we cannot get it up here, can you please see if heart station can do it?  If Heart station able to do tomorrow, excellent, otherwise will have to schedule next week.  Please schedule on a day when either Sindy or I can see pt.  Thanks    KALE GLASER

## 2022-11-22 NOTE — TELEPHONE ENCOUNTER
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  For 5-7 days having palpitations every few beats starting at night and lasting until the AM. During the day time the frequency goes back to her norm which is 3-4 times. She has tried meditating/relaxing, holding her breath, hydrating, deep breathing but nothing helps. She has no SOB, dizziness with it but last PM she had a 10 minute episode of 1/10 chest discomfort in the upper left quadrant of her chest.     Waking every 2 hours for breast feeding and also has some outside stressors.    Started [employer] (Norethindrone) one month and one week ago.  No other changes.    Used to take Propranolol PRN but would get abnormal BS and that was stopped.     No BP or pulse rates.    Call patient at [phone]

## 2022-11-22 NOTE — TELEPHONE ENCOUNTER
Please see prior.  Last OV July 2022 during pregnancy.  Notes reflect plan for Holter if no improvement   in symptoms post-partum.  Can we get her in for OV with SF or RT and Holter at that time?  Please advise.  Thank you.

## 2022-12-05 NOTE — TELEPHONE ENCOUNTER
Called patient to schedule office visit and holter monitor, but was only able to lm. Awaiting call back to schedule.

## 2022-12-05 NOTE — TELEPHONE ENCOUNTER
Pt called to schedule OV and holter monitor. Pt says she leave Monday for [place] for two weeks     P: [phone]

## 2022-12-07 NOTE — PROGRESS NOTES
"12/08/22  Reason for visit: Cardiovascular follow-up    ADELIA Murry is a 32 y.o. female with a history of palpitations.  She was seen in initial cardio-obstetrics consultation by Dr. Dietz on July 5, 2022.    Historically, she reports a longstanding history of palpitations described as a sensation \"like a roller coaster drop.\"  Symptoms lasted a second or so and spontaneously resolved.  Palpitations started many years ago and have been previously evaluated with extensive monitoring which was reportedly unremarkable, although she does recall premature ventricular beats being reported.  She was previously seen by cardiology who recommended no further follow-up given the reportedly benign process.      Her symptoms increased with pregnancy and subsequently returned to her baseline after delivery.  She typically notices her palpitations 1-15 times per day and denies associated chest pain, dyspnea, lightheadedness, dizziness, or syncope.  She does also describe a sensation like she is having a few extra beats, sometimes multiple in a row.      Since her last visit, Adelia delivered a healthy baby girl on July 16, 2022.  She called the office recently to report increase in nighttime palpitations for about 4 days.  She was feeling palpitations every few minutes overnight and they would return to her normal level during the day.  She reports this same frequent palpitations happened again occurred a few days ago.      Today, she reports feeling well in general and denies any acute cardiovascular symptoms.  Her palpitations are as above.  She denies any drug or recent alcohol use and does not smoke.  She drinks one cup of coffee daily.  She is waking up to feed her daughter about every two hours overnight which obviously impacts her sleep.  She had some increased family stress around Thanksgiving which has, fortunately, now improved.  She is on progesterone birth control.  She is not exercising right now due to " taking care of her daughter.  She has an upcoming two-week trip to [place].    She denies chest pain, shortness of breath, dyspnea on exertion, orthopnea, PND, lower extremity edema, lightheadedness, dizziness, or syncope.        Past Medical History:  1.  Palpitations  2.  Obstetric history:     Past Surgical History:   1. Myringotomy with tube placement    Medications: prenatal vitamin, norethindrone birth control.    Allergies: Patient has no known allergies.    Social History: Never smoker. Social alcohol use when not pregnant. 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     Family History: Palpitations in father, paternal grandmother and grandfather.    Exam:  Objective      .Review of Systems   Constitutional: Positive for weight loss (postpartum). Negative for chills and fever.   Cardiovascular: Positive for palpitations. Negative for chest pain, claudication, cyanosis, dyspnea on exertion, irregular heartbeat, leg swelling, near-syncope, orthopnea, paroxysmal nocturnal dyspnea and syncope.   Respiratory: Negative for shortness of breath and sleep disturbances due to breathing.    Neurological: Negative for dizziness and light-headedness.   All other systems reviewed and are negative.      Vitals:    22 1124   BP: 118/68   Pulse: 80   Resp: 16   SpO2: 98%        Wt Readings from Last 5 Encounters:   22 57.6 kg (127 lb)   22 73.5 kg (162 lb)   22 71.2 kg (157 lb)     Body mass index is 21.8 kg/m².     Physical Exam  Constitutional:       General: She is not in acute distress.     Appearance: She is well-developed.   HENT:      Head: Normocephalic and atraumatic.      Right Ear: External ear normal.      Left Ear: External ear normal.      Nose:      Comments: mask     Mouth/Throat:      Mouth: Mucous membranes are not cyanotic.      Comments: mask  Eyes:      Conjunctiva/sclera: Conjunctivae normal.   Neck:      Vascular: No carotid bruit or JVD.   Cardiovascular:       Rate and Rhythm: Normal rate and regular rhythm.  No extrasystoles are present.     Pulses:           Carotid pulses are 2+ on the right side and 2+ on the left side.       Radial pulses are 2+ on the right side and 2+ on the left side.      Heart sounds: S1 normal and S2 normal. No murmur heard.    No gallop.   Pulmonary:      Effort: Pulmonary effort is normal. No respiratory distress.      Breath sounds: No wheezing or rales.   Abdominal:      General: Bowel sounds are normal.      Palpations: Abdomen is soft.      Tenderness: There is no abdominal tenderness.   Musculoskeletal:         General: Normal range of motion.      Right lower leg: No edema.      Left lower leg: No edema.   Skin:     General: Skin is warm and dry.      Capillary Refill: Capillary refill takes less than 2 seconds.   Neurological:      Mental Status: She is alert.   Psychiatric:         Mood and Affect: Mood normal.         Speech: Speech normal.         Behavior: Behavior normal.         Thought Content: Thought content normal.         Judgment: Judgment normal.         Labs:  Lab Results   Component Value Date    WBC 9.33 07/16/2022    HGB 11.8 07/16/2022     07/16/2022     No recent labs available for review.    Cardiovascular Studies:   HOLTER MONITOR 08/02/2017 (report from Care Everywhere)  The predominant cardiac rhythm was sinus rhythm with intermittent sinus tachycardia. The average heart rate was 88 bpm (ranging from 63 bpm to 139 bpm) throughout the 24 hour study. No ventricular ectopy was noted during the study. Supraventricular ectopy consisted of very rare PACs. The patient was tachycardic for 4.0 hours of the 24 hour study. The patient did not return the holter diary.   24-hour average was high at 88 BPM.    No ventricular ectopy and just 2 PAC's in the 24 hours.       ECG: from today- sinus rhythm, rate 86.    Assessment/Plan   Problem List Items Addressed This Visit        Circulatory    Palpitations - Primary      Palpitations previously confirmed to be consistent with premature atrial and/or ventricular beats on extended monitoring.  ECG today shows sinus rhythm without ectopy.  She had initial improvement in palpitations back to her baseline after delivery but had recent increase in nighttime palpitations.  These may have been in the setting of increased stress but unsure.  -- 24 hour holter monitor placed today.  If holter monitor is unrevealing, can consider longer event monitor.   -- Check labs- CMP, CBC, TSH, and magnesium ordered.  -- Encourage hydration and sleep as able.         Relevant Orders    ECG 12 LEAD-OFFICE PERFORMED (Completed)    Comprehensive metabolic panel    CBC and differential    TSH w reflex FT4    Magnesium     Thank you for allowing me to participate in the care of this patient.  Please do not hesitate to reach out with any questions or concerns.    Disclaimer: This note was generated using speech-recognition software.  Please disregard any obvious grammatical or typographical errors.    Return in about 6 weeks (around 1/19/2023).        KERRY La

## 2022-12-08 ENCOUNTER — APPOINTMENT (OUTPATIENT)
Dept: CARDIOLOGY | Facility: CLINIC | Age: 32
End: 2022-12-08
Attending: NURSE PRACTITIONER
Payer: COMMERCIAL

## 2022-12-08 ENCOUNTER — OFFICE VISIT (OUTPATIENT)
Dept: CARDIOLOGY | Facility: CLINIC | Age: 32
End: 2022-12-08
Payer: COMMERCIAL

## 2022-12-08 VITALS
BODY MASS INDEX: 21.68 KG/M2 | HEART RATE: 80 BPM | WEIGHT: 127 LBS | SYSTOLIC BLOOD PRESSURE: 118 MMHG | HEIGHT: 64 IN | RESPIRATION RATE: 16 BRPM | OXYGEN SATURATION: 98 % | DIASTOLIC BLOOD PRESSURE: 68 MMHG

## 2022-12-08 DIAGNOSIS — R00.2 PALPITATIONS: Primary | ICD-10-CM

## 2022-12-08 DIAGNOSIS — R00.2 PALPITATIONS: ICD-10-CM

## 2022-12-08 PROCEDURE — 3008F BODY MASS INDEX DOCD: CPT

## 2022-12-08 PROCEDURE — 99214 OFFICE O/P EST MOD 30 MIN: CPT

## 2022-12-08 PROCEDURE — 93000 ELECTROCARDIOGRAM COMPLETE: CPT

## 2022-12-08 ASSESSMENT — ENCOUNTER SYMPTOMS
PALPITATIONS: 1
IRREGULAR HEARTBEAT: 0
DIZZINESS: 0
WEIGHT LOSS: 1
PND: 0
SHORTNESS OF BREATH: 0
NEAR-SYNCOPE: 0
CHILLS: 0
LIGHT-HEADEDNESS: 0
DYSPNEA ON EXERTION: 0
FEVER: 0
ORTHOPNEA: 0
SLEEP DISTURBANCES DUE TO BREATHING: 0
SYNCOPE: 0
CLAUDICATION: 0

## 2022-12-08 NOTE — ASSESSMENT & PLAN NOTE
Palpitations previously confirmed to be consistent with premature atrial and/or ventricular beats on extended monitoring.  ECG today shows sinus rhythm without ectopy.  She had initial improvement in palpitations back to her baseline after delivery but had recent increase in nighttime palpitations.  These may have been in the setting of increased stress but unsure.  -- 24 hour holter monitor placed today.  If holter monitor is unrevealing, can consider longer event monitor.   -- Check labs- CMP, CBC, TSH, and magnesium ordered.  -- Encourage hydration and sleep as able.

## 2022-12-08 NOTE — PROGRESS NOTES
I was present in the office and provided direct supervision.   I have reviewed and agree with the diagnosis and treatment plan.  Cleo Dietz MD      Addendum 2/13/2023: No cardiovascular contraindication to ENT surgery with low anticipated cardiac risk.  No cardiovascular studies required.

## 2022-12-13 PROCEDURE — 93224 XTRNL ECG REC UP TO 48 HRS: CPT | Performed by: INTERNAL MEDICINE

## 2023-02-03 ENCOUNTER — TRANSCRIBE ORDERS (OUTPATIENT)
Dept: SCHEDULING | Age: 33
End: 2023-02-03

## 2023-02-03 DIAGNOSIS — J32.9 CHRONIC SINUSITIS, UNSPECIFIED: ICD-10-CM

## 2023-02-03 DIAGNOSIS — Z01.812 ENCOUNTER FOR PREPROCEDURAL LABORATORY EXAMINATION: Primary | ICD-10-CM

## 2023-02-09 ENCOUNTER — TELEPHONE (OUTPATIENT)
Dept: SCHEDULING | Facility: CLINIC | Age: 33
End: 2023-02-09
Payer: COMMERCIAL

## 2023-02-09 NOTE — TELEPHONE ENCOUNTER
Cardiac Clearance     Name of caller: IRIS Jones Lovely    Relationship to patient: self    Name of patient: IRIS Murry    Insurance Name: [facility]    Name of physician: Cleo Dietz MD    Date of Procedure/Surgery: 3/20/23    Type of Procedure/Surgery: deviated sceptumn    Name of surgeon: Dr.Mathew Garcia    Office contact number: [phone]    Office fax number: [phone]    Addendums  Is patient able to be cleared based on last appt on 12/8/22  If unable to clear patient, please contact patient at [phone]      Additional notes: pt is looking for a call back regarding appt if needed, pt needs to know due to having to set up work schedule for next month

## 2023-02-23 NOTE — TELEPHONE ENCOUNTER
Pt states clearance appt has to happen within 30 days of the procedure , please advise    Procedure : 3/20/23    P:[phone]    Ty

## 2023-02-27 ENCOUNTER — HOSPITAL ENCOUNTER (OUTPATIENT)
Facility: HOSPITAL | Age: 33
End: 2023-02-27
Attending: OTOLARYNGOLOGY | Admitting: OTOLARYNGOLOGY
Payer: COMMERCIAL

## 2023-03-03 PROBLEM — Z01.810 PREOPERATIVE CARDIOVASCULAR EXAMINATION: Status: ACTIVE | Noted: 2023-03-03

## 2023-03-03 ASSESSMENT — ENCOUNTER SYMPTOMS
SYNCOPE: 0
ORTHOPNEA: 0
IRREGULAR HEARTBEAT: 0
LIGHT-HEADEDNESS: 0
SHORTNESS OF BREATH: 0
CHILLS: 0
PALPITATIONS: 1
DYSPNEA ON EXERTION: 0
CLAUDICATION: 0
FEVER: 0
NEAR-SYNCOPE: 0
SLEEP DISTURBANCES DUE TO BREATHING: 0
DIZZINESS: 0
PND: 0

## 2023-03-03 NOTE — ASSESSMENT & PLAN NOTE
Preop exam preceding septoplasty and turbinectomy with Dr. Garcia on March 20, 2023.  No known CAD, heart failure, diabetes, stroke, or kidney disease.  No available creatinine for review.    According to the Phillips Perioperative Risk Score (DINA), Adelia has a less than 1% probability of major adverse cardiovascular event intraoperatively or up to 30 days post-operatively.  She may proceed to her anticipated procedure without further cardiovascular testing.  She has no medications which require perioperative hold.

## 2023-03-03 NOTE — PROGRESS NOTES
" KERRY Collins  Cardiology    [facility]    [facility]  [address]  [address] [place] [address]    TEL  [phone]  [facility]     03/07/2023    IRIS Murry is a 32 y.o. female with a history of palpitations.  She was seen in initial cardio-obstetrics consultation by Dr. Dietz on July 5, 2022 and subsequently by me on December 8, 2022.    At her last visit, she reported increased nighttime palpitations described as a \"roller coaster drop\" which occurred every few minutes overnight and returned to her usual level during the day.  Notably, she has a longstanding history of palpitations and these felt similar in presentation but more frequent.  She had significantly interrupted sleep due to her daughter's overnight feedings and adjusting to motherhood.  She denied associated cardiovascular symptoms with her palpitations.  A 24-hour holter monitor showed sinus rhythm with PVCs, no concerning arrhythmias.    She presents today for preoperative cardiovascular exam preceding surgical repair of her deviated septum with Dr. Garcia on March 20, 2023.  She feels well in general.  Her palpitations have returned to her usual level, occurring multiple times per day and described as \"roller coaster drop\" and a few \"skipped beats\" in a row.  She denies any associated chest pain, dyspnea, lightheadedness, dizziness, or syncope with her palpitations.  Further, she denies chest pain, shortness of breath, dyspnea on exertion, orthopnea, PND, lower extremity edema, lightheadedness, dizziness, or syncope.    She has no known history of coronary artery disease, heart failure, diabetes, or stroke.  She has no known history of kidney disease.  She does not participate in formal exercise but is active with her daughter.  She walks 1-3 miles a couple of times per week and participates in her daily activities without cardiovascular symptom " or concern.        Past Medical History:  1.  Palpitations  2.  Obstetric history:     Past Surgical History:   1. Myringotomy with tube placement    Medications: no current medications.    Allergies: Patient has no known allergies.    Social History: Never smoker. No recent alcohol use. 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     Family History: Palpitations in father, paternal grandmother and grandfather.    Exam:  Objective      .Review of Systems   Constitutional: Negative for chills and fever.   Cardiovascular: Positive for palpitations. Negative for chest pain, claudication, cyanosis, dyspnea on exertion, irregular heartbeat, leg swelling, near-syncope, orthopnea, paroxysmal nocturnal dyspnea and syncope.   Respiratory: Negative for shortness of breath and sleep disturbances due to breathing.    Neurological: Negative for dizziness and light-headedness.   All other systems reviewed and are negative.      Vitals:    23 1334   BP: 108/60   Pulse: 86   Resp: 16   SpO2: 97%     Wt Readings from Last 5 Encounters:   23 56.9 kg (125 lb 8 oz)   22 57.6 kg (127 lb)   22 73.5 kg (162 lb)   22 71.2 kg (157 lb)     Body mass index is 21.54 kg/m².     Physical Exam  Vitals reviewed.   Constitutional:       General: She is not in acute distress.     Appearance: She is well-developed.   HENT:      Head: Normocephalic and atraumatic.      Right Ear: External ear normal.      Left Ear: External ear normal.      Nose:      Comments: mask     Mouth/Throat:      Mouth: Mucous membranes are not cyanotic.      Comments: mask  Eyes:      Conjunctiva/sclera: Conjunctivae normal.   Neck:      Vascular: No carotid bruit or JVD.   Cardiovascular:      Rate and Rhythm: Normal rate and regular rhythm. No extrasystoles are present.     Pulses: Normal pulses.           Carotid pulses are 2+ on the right side and 2+ on the left side.       Radial pulses are 2+ on the right side and 2+ on the  left side.      Heart sounds: Normal heart sounds, S1 normal and S2 normal. No murmur heard.     No gallop.   Pulmonary:      Effort: Pulmonary effort is normal. No respiratory distress.      Breath sounds: No wheezing or rales.   Abdominal:      Palpations: Abdomen is soft.      Tenderness: There is no abdominal tenderness.   Musculoskeletal:         General: Normal range of motion.      Cervical back: Normal range of motion and neck supple.      Right lower leg: No edema.      Left lower leg: No edema.   Skin:     General: Skin is warm and dry.      Capillary Refill: Capillary refill takes less than 2 seconds.   Neurological:      General: No focal deficit present.      Mental Status: She is alert.   Psychiatric:         Mood and Affect: Mood normal.         Speech: Speech normal.         Behavior: Behavior normal.         Thought Content: Thought content normal.         Judgment: Judgment normal.         Labs:  Lab Results   Component Value Date    WBC 9.33 07/16/2022    HGB 11.8 07/16/2022     07/16/2022     No recent labs available for review.    Cardiovascular Studies:   HOLTER MONITOR 12/08/2022  1. The patient was monitor for 24 hours.  2. The predominant rhythm was sinus rhythm.  3. The average heart rate was 91 bpm.  4. The minimum heart rate was 64 bpm.  5. The maximum heart rate was 142 bpm.  6. There was 1 supraventricular beat.  7. There were 19 premature ventricular beats.  8. Patient logged activities without symptoms.    HOLTER MONITOR 08/02/2017 (report from Care Everywhere)  The predominant cardiac rhythm was sinus rhythm with intermittent sinus tachycardia. The average heart rate was 88 bpm (ranging from 63 bpm to 139 bpm) throughout the 24 hour study.   No ventricular ectopy was noted during the study.   Supraventricular ectopy consisted of very rare PACs.   The patient was tachycardic for 4.0 hours of the 24 hour study.   The patient did not return the holter diary.   24-hour average was  high at 88 BPM.    No ventricular ectopy and just 2 PAC's in the 24 hours.       ECG: from today- normal sinus rhythm, rate 86.        Assessment/Plan   Problem List Items Addressed This Visit        Circulatory    Palpitations     Palpitations previously confirmed to be consistent with premature atrial and/or ventricular beats on extended monitoring.  ECG today shows sinus rhythm without ectopy.  -- Continue conservative management            Other    Preoperative cardiovascular examination - Primary     Preop exam preceding septoplasty and turbinectomy with Dr. Garcia on March 20, 2023.  No known CAD, heart failure, diabetes, stroke, or kidney disease.  No available creatinine for review.    According to the Phillips Perioperative Risk Score (DINA), Adelia has a less than 1% probability of major adverse cardiovascular event intraoperatively or up to 30 days post-operatively.  She may proceed to her anticipated procedure without further cardiovascular testing.  She has no medications which require perioperative hold.         Relevant Orders    ECG 12 lead (Completed)     Thank you for allowing me to participate in the care of this patient.  Please do not hesitate to reach out with any questions or concerns.    Disclaimer: This note was generated using speech recognition software.  A reasonable attempt has been made at proofreading.  Please disregard any obvious grammatical or typographical errors.    Return in about 6 months (around 9/7/2023), or if symptoms worsen or fail to improve.        KERRY La  03/07/2023

## 2023-03-07 ENCOUNTER — OFFICE VISIT (OUTPATIENT)
Dept: CARDIOLOGY | Facility: CLINIC | Age: 33
End: 2023-03-07
Payer: COMMERCIAL

## 2023-03-07 VITALS
DIASTOLIC BLOOD PRESSURE: 60 MMHG | OXYGEN SATURATION: 97 % | SYSTOLIC BLOOD PRESSURE: 108 MMHG | HEART RATE: 86 BPM | HEIGHT: 64 IN | RESPIRATION RATE: 16 BRPM | BODY MASS INDEX: 21.43 KG/M2 | WEIGHT: 125.5 LBS

## 2023-03-07 DIAGNOSIS — Z01.810 PREOPERATIVE CARDIOVASCULAR EXAMINATION: Primary | ICD-10-CM

## 2023-03-07 DIAGNOSIS — R00.2 PALPITATIONS: ICD-10-CM

## 2023-03-07 PROCEDURE — 99214 OFFICE O/P EST MOD 30 MIN: CPT

## 2023-03-07 PROCEDURE — 3008F BODY MASS INDEX DOCD: CPT

## 2023-03-07 PROCEDURE — 93000 ELECTROCARDIOGRAM COMPLETE: CPT

## 2023-03-08 ENCOUNTER — APPOINTMENT (OUTPATIENT)
Dept: PREADMISSION TESTING | Facility: HOSPITAL | Age: 33
End: 2023-03-08
Payer: COMMERCIAL

## 2023-03-08 NOTE — PROGRESS NOTES
I was present in the office and provided direct supervision.   I have reviewed and agree with the diagnosis and treatment plan.  Cleo Dietz MD

## 2023-03-09 ENCOUNTER — APPOINTMENT (OUTPATIENT)
Dept: PREADMISSION TESTING | Facility: HOSPITAL | Age: 33
End: 2023-03-09
Payer: COMMERCIAL

## 2023-03-09 VITALS — BODY MASS INDEX: 21.34 KG/M2 | HEIGHT: 64 IN | WEIGHT: 125 LBS

## 2023-03-09 ASSESSMENT — PAIN SCALES - GENERAL: PAINLEVEL: 0-NO PAIN

## 2023-03-09 NOTE — PRE-PROCEDURE INSTRUCTIONS
1.      You will be called between 3pm -7pm one business day before your procedure  March 17, 2023   to tell you where and when to report.  If you do not receive a phone call by 6pm, please call the Admissions office at [phone] to determine the arrival time for your procedure.      2.        Please report to Admissions or Short Procedure Unit, park in lot A, on the day of your procedure.  Detailed directions will be given to you when you are called with arrival time.      3.      No solid food for EIGHT HOURS prior to surgery- No food after midnight.    Unlimited CLEAR liquids, meaning water or PLAIN black coffee (WITHOUT any milk, cream, sugar, or sweetener) are permitted up to TWO HOURS prior to arrival at the hospital. Follow Dr. Garcia's instructions on eating and drinking     4.      Early on the morning of the procedure please take your usual dose of the listed medications with a sip of water: Nothing to take the morning of the procedure    No NSAIDs, Supplements, Vitamins from 3/13/23 until after surgery.      May take Tylenol if needed.       5.      Other Instructions: You may brush your teeth the morning of the procedure. Rinse and spit, do not swallow.  Bring a list of your medications with dosages with you.  Use surgical wash as directed. Dial Antibacterial Soap the night before and the morning of the procedure.   6.      If you develop a cold, cough, fever, rash, or other symptom prior to the data of the procedure, please report it to your physician immediately.     7.      If you need to cancel the procedure for any reason, please contact your physician.     8.      Make arrangements to have someone drive you home from the procedure. If you have not arranged for transportation home, your surgery may be cancelled.      9.      You may not take public transportation unless you are accompanied by a responsible person.     10.      You may not drive a car or operate complex or potentially dangerous  machinery for 24 hours following anesthesia and/or sedation.      11.      12.      If it is medically necessary for you to have a longer stay, you will be informed as soon as the decision is made.              Only bring essential items to the hospital.  Do not wear or bring anything of value to the hospital including jewelry of any kind, money, or wallet. Do not wear make-up or contact lenses. Do not BRING MEDICATIONS FROM HOME unless instructed to do so.  DO bring your hearing aids, glasses, and a case.        13.      No lotion, creams, powders, or oils on skin the morning of procedure        14.      Dress in comfortable clothes.     15.      If instructed, please bring a copy of your Advanced Directive (Living Will/Durable Power of ) on the day of your procedure.      16.      17.            18.       19.       Patients need to quarantine from the time of PAT COVID test to day of surgery, regardless of COVID vaccine status.         Ensuring your safety at all times is a very important part of out [facility] Culture of Safety . After having surgery and sedation, you are at risk for falling and balance issues.  Although you may feel awake, the effects of the medication can last up to 24 hours after anesthesia.  If you need to use the bathroom during your recovery period, nursing staff will escort you there and stay with you to ensure your safety.          Refrain from drinking alcohol and smoking cigarettes for 24 hours prior to surgery.         Shower with antibacterial soap (DIAL) the night before and morning of your procedure.  If your procedure indicates the need for CHG antiseptic was (Bactoshield or Hibiclens), please use this instead and follow instructions as discussed at the time of your Pre-Admission Testing visit or phone interview.     Above instructions reviewed with patient and patient acknowledges understanding.

## 2023-03-16 NOTE — H&P
Chief complaint: No chief complaint on file.    HPI     Patient is a 32 y.o. female who presents with nasal congestion.     Refractory to medical management with deviated nasal septum and hypertrophied nasal turbinates.     Of note, patient cleared by cardio 3/7 in plan for procedure 3/20.  No known CAD, heart failure, diabetes, stroke, or kidney disease.   According to the Phillips Perioperative Risk Score (DINA), Adelia has a less than 1% probability of major adverse cardiovascular event intraoperatively or up to 30 days post-operatively.  She may proceed to her anticipated procedure without further cardiovascular testing.  She has no medications which require perioperative hold.      Medical History:   Past Medical History:   Diagnosis Date   • Abnormal ECG    • Anxiety     hx of   • Murmur     hx of   • Scoliosis     hx of       Surgical History:   Past Surgical History:   Procedure Laterality Date   • MYRINGOTOMY AND TUBE/MYRINGOPLASTY      hx of       Social History:   Social History     Socioeconomic History   • Marital status:    Tobacco Use   • Smoking status: Never   • Smokeless tobacco: Never   Vaping Use   • Vaping status: Never Used   Substance and Sexual Activity   • Alcohol use: Not Currently   • Drug use: Never   • Sexual activity: Yes     Comment:        Family History:   Family History   Problem Relation Age of Onset   • No Known Problems Biological Mother    • Transient ischemic attack Biological Father    • Hemochromatosis Biological Father    • No Known Problems Biological Brother        Allergies: Patient has no known allergies.       Medication List       Notice    Cannot display discharge medications because the patient has not yet been admitted.       Review of Systems  Pertinent items are noted in HPI.    Objective     Vital Signs for the last 24 hours:       Physical Exam:  GENERAL: NAD, AAOx3  EARS: Pinna and external ear WNL, EAC patent b/l, TMI b/l, no effusion, otorrhea,  perforation  NOSE: External nose without obvious deformity, lesion, or mass. Nasal cavity clear without lesions or masses on anterior rhinoscopy, deviated nasal septum, ITH   OC/OP/MOUTH: MMM, no lesions or masses/ FOM soft, uvula midline  FACE/NECK: Symmetric without weakness or gross derangement.  LARYNX: no hoarseness        Assessment/Plan      32 y.o. female who presents with nasal congestion refractory to medical managment. Here for septoplasty/ITR with Dr. Garcia 3/20.     - Informed consent obtained by Dr. Degroot  - Patient cleared by cardio 3/7 in plan for procedure 3/20.  No known CAD, heart failure, diabetes, stroke, or kidney disease.   According to the Phillips Perioperative Risk Score (DINA), Adelia has a less than 1% probability of major adverse cardiovascular event intraoperatively or up to 30 days post-operatively.  She may proceed to her anticipated procedure without further cardiovascular testing.  She has no medications which require perioperative hold.  - Follow up approx 1 week post op with Dr. Cisse

## 2023-06-20 NOTE — ASSESSMENT & PLAN NOTE
Palpitations previously confirmed to be consistent with premature atrial and/or ventricular beats on extended monitoring.  ECG today shows sinus rhythm without ectopy.  -- Continue conservative management   Will discuss her pain and neurologic disorders at her appointment next week.    Recommend that she discuss Otezla with Dr. Chinchilla, and her allergy to diuretic and possibility of cellulitis with her PCP.

## 2024-03-26 ENCOUNTER — OFFICE VISIT (OUTPATIENT)
Dept: PRIMARY CARE | Facility: CLINIC | Age: 34
End: 2024-03-26
Payer: COMMERCIAL

## 2024-03-26 VITALS
TEMPERATURE: 97.8 F | WEIGHT: 130 LBS | OXYGEN SATURATION: 98 % | BODY MASS INDEX: 22.2 KG/M2 | RESPIRATION RATE: 16 BRPM | DIASTOLIC BLOOD PRESSURE: 68 MMHG | HEIGHT: 64 IN | HEART RATE: 88 BPM | SYSTOLIC BLOOD PRESSURE: 110 MMHG

## 2024-03-26 DIAGNOSIS — Z80.8 FAMILY HISTORY OF SKIN CANCER: ICD-10-CM

## 2024-03-26 DIAGNOSIS — Z00.00 ANNUAL PHYSICAL EXAM: Primary | ICD-10-CM

## 2024-03-26 DIAGNOSIS — G89.29 CHRONIC HAND PAIN, UNSPECIFIED LATERALITY: ICD-10-CM

## 2024-03-26 DIAGNOSIS — M79.643 CHRONIC HAND PAIN, UNSPECIFIED LATERALITY: ICD-10-CM

## 2024-03-26 PROCEDURE — 3008F BODY MASS INDEX DOCD: CPT | Performed by: STUDENT IN AN ORGANIZED HEALTH CARE EDUCATION/TRAINING PROGRAM

## 2024-03-26 PROCEDURE — 99385 PREV VISIT NEW AGE 18-39: CPT | Performed by: STUDENT IN AN ORGANIZED HEALTH CARE EDUCATION/TRAINING PROGRAM

## 2024-03-26 ASSESSMENT — PATIENT HEALTH QUESTIONNAIRE - PHQ9: SUM OF ALL RESPONSES TO PHQ9 QUESTIONS 1 & 2: 0

## 2024-03-26 ASSESSMENT — ENCOUNTER SYMPTOMS: ARTHRALGIAS: 1

## 2024-03-26 NOTE — ASSESSMENT & PLAN NOTE
See HPI.  VS reviewed    Plan:  -Labs pending (CBC, CMP, TSH, A1c, Lipid)  -Diet and exercise encouraged.  -Not interested in vaccinations today.   -Return in 1 year for annual exam.

## 2024-03-26 NOTE — PROGRESS NOTES
Subjective      Patient ID: IRIS Murry is a 33 y.o. female here for the following:   Establish Care      HPI    #Establish Care  PMH:    #Cough     #Pregnancy  -- 7 weeks pregnant    #Hoffa disease     #Palpitation  #Heart murmur -- was f/w cardiology, cleared. F/u as needed     #Family history of skin cancer -- set to establish with derm    # Hand pain, bilaterally --patient reports chronic bilateral hand discomfort that periodically occurs. She also reports intermittent locking sensation of the fingers. She denies having any nerve related pains and denies any nocturnal symptoms. She denies weakness. She denies any personal or family history of any autoimmune conditions.  Patient does work on a computer.    -Diet: Well balanced diet   -Exercise: No formal exercise regimen  -Mood: Good, denies depression  -Menses: Regular cycles    Specialist:   -Derm   -OB/GYN  -Cardiology     Due for cervical cancer screening  Due for COVID, flu vaccinations  Up to date on all other screening exams/vaccinations    The following have been reviewed and updated as appropriate in this visit:      Allergies  Meds  Problems  Social History      Patient Active Problem List   Diagnosis    Palpitations    Term pregnancy    Annual physical exam    Chronic hand pain    .    Social History     Tobacco Use    Smoking status: Never    Smokeless tobacco: Never   Vaping Use    Vaping Use: Never used   Substance Use Topics    Alcohol use: Not Currently    Drug use: Never       Allergies as of 03/26/2024    (No Known Allergies)         Current Outpatient Medications:     dextromethorphan 15 mg/5 mL syrup, Take 10 mL (30 mg total) by mouth 4 (four) times a day as needed for cough for up to 10 days., Disp: 120 mL, Rfl: 0      Review of systems as per HPI and below    PHQ-9 Results  Will the patient answer the depression questions?: Y    Little interest or pleasure in doing things: Not at all    Feeling down, depressed, or hopeless: Not at  "all    Depression Risk: 0    No data recorded  No data recorded  No data recorded  No data recorded  No data recorded  No data recorded  No data recorded  No data recorded  No data recorded  No data recorded    Williams Suicide Severity Rating Scale  No data recorded  No data recorded  No data recorded  No data recorded  No data recorded  No data recorded  No data recorded    Review of Systems   Musculoskeletal:  Positive for arthralgias.        Hand pain/locking sensation    All other systems reviewed and are negative.    Objective   Vitals:   Vitals:    03/26/24 1504   BP: 110/68   BP Location: Left upper arm   Patient Position: Sitting   Pulse: 88   Resp: 16   Temp: 36.6 °C (97.8 °F)   TempSrc: Temporal   SpO2: 98%   Weight: 59 kg (130 lb)   Height: 1.626 m (5' 4\")     Body mass index is 22.31 kg/m².    Physical Exam  Constitutional:       General: She is not in acute distress.     Appearance: Normal appearance. She is not ill-appearing.   HENT:      Head: Normocephalic.      Right Ear: Tympanic membrane, ear canal and external ear normal. There is no impacted cerumen.      Left Ear: Tympanic membrane, ear canal and external ear normal. There is no impacted cerumen.      Nose: Nose normal.      Mouth/Throat:      Mouth: Mucous membranes are moist.   Eyes:      Extraocular Movements: Extraocular movements intact.      Conjunctiva/sclera: Conjunctivae normal.      Pupils: Pupils are equal, round, and reactive to light.   Cardiovascular:      Rate and Rhythm: Normal rate and regular rhythm.      Pulses: Normal pulses.      Heart sounds: Normal heart sounds. No murmur heard.     No friction rub. No gallop.   Pulmonary:      Effort: Pulmonary effort is normal. No respiratory distress.      Breath sounds: Normal breath sounds. No stridor. No wheezing, rhonchi or rales.   Chest:      Chest wall: No tenderness.   Abdominal:      General: Bowel sounds are normal. There is no distension.      Palpations: Abdomen is soft. "      Tenderness: There is no abdominal tenderness.   Musculoskeletal:         General: Normal range of motion.      Cervical back: Normal range of motion. No rigidity.      Right lower leg: No edema.      Left lower leg: No edema.   Lymphadenopathy:      Cervical: No cervical adenopathy.   Skin:     General: Skin is warm.      Capillary Refill: Capillary refill takes less than 2 seconds.   Neurological:      General: No focal deficit present.      Mental Status: She is alert and oriented to person, place, and time.      Cranial Nerves: No cranial nerve deficit.      Sensory: No sensory deficit.      Motor: No weakness.      Coordination: Coordination normal.      Gait: Gait normal.   Psychiatric:         Mood and Affect: Mood normal.         Behavior: Behavior normal.         ASSESSMENT & PLAN    Problem List Items Addressed This Visit          Musculoskeletal    Chronic hand pain     Chronic bilateral hand pain  Denies having any radicular pain or wrist pains    Bilateral x-ray hands pending  JUVENAL pending  May need to see hand specialist if symptoms persist and there is no identifiable cause of locking/pain         Relevant Orders    X-RAY HAND BILATERAL 2 VW       Other    Annual physical exam - Primary     See HPI.  VS reviewed    Plan:  -Labs pending (CBC, CMP, TSH, A1c, Lipid)  -Diet and exercise encouraged.  -Not interested in vaccinations today.   -Return in 1 year for annual exam.           Relevant Orders    JUVENAL, IFA    Comprehensive metabolic panel    Hemoglobin A1c    CBC and Differential    Lipid panel    TSH w reflex FT4     Other Visit Diagnoses       Family history of skin cancer        Relevant Orders    Ambulatory referral to Dermatology            Orders Placed This Encounter   Procedures    X-RAY HAND BILATERAL 2 VW     Standing Status:   Future     Standing Expiration Date:   3/26/2025     Order Specific Question:   Is the patient pregnant?     Answer:   Unknown     Order Specific Question:    Release to patient     Answer:   Immediate [1]    JUVENAL, IFA     Standing Status:   Future     Number of Occurrences:   1     Standing Expiration Date:   3/26/2025     Order Specific Question:   Release to patient     Answer:   Immediate [1]    Comprehensive metabolic panel     Standing Status:   Future     Number of Occurrences:   1     Standing Expiration Date:   3/26/2025     Order Specific Question:   Release to patient     Answer:   Immediate [1]    Hemoglobin A1c     Standing Status:   Future     Number of Occurrences:   1     Standing Expiration Date:   3/26/2025     Order Specific Question:   Release to patient     Answer:   Immediate [1]    CBC and Differential     Standing Status:   Future     Number of Occurrences:   1     Standing Expiration Date:   3/26/2025     Order Specific Question:   Release to patient     Answer:   Immediate [1]    Lipid panel     Standing Status:   Future     Number of Occurrences:   1     Standing Expiration Date:   3/26/2025     Order Specific Question:   Release to patient     Answer:   Immediate [1]    TSH w reflex FT4     Standing Status:   Future     Number of Occurrences:   1     Standing Expiration Date:   3/26/2025     Order Specific Question:   Release to patient     Answer:   Immediate [1]    Ambulatory referral to Dermatology     Standing Status:   Future     Standing Expiration Date:   9/26/2024     Referral Priority:   Routine     Referral Type:   Consultation     Referral Reason:   Specialty Services Required     Referred to Provider:   System, Provider Not In     Number of Visits Requested:   10           _____________________  Dickson Desai MD  03/26/24

## 2024-03-26 NOTE — ASSESSMENT & PLAN NOTE
Chronic bilateral hand pain  Denies having any radicular pain or wrist pains    Bilateral x-ray hands pending  JUVENAL pending  May need to see hand specialist if symptoms persist and there is no identifiable cause of locking/pain

## 2024-03-30 LAB
HBV SURFACE AG SER QL: NONREACTIVE
HIV 1+2 AB+HIV1 P24 AG SERPL QL IA: NONREACTIVE
RUBELLA IGG SCREEN: NORMAL
T PALLIDUM AB SER QL IF: NONREACTIVE

## 2024-04-01 ENCOUNTER — TRANSCRIBE ORDERS (OUTPATIENT)
Dept: SCHEDULING | Age: 34
End: 2024-04-01

## 2024-04-01 DIAGNOSIS — Z36.87 ENCOUNTER FOR ANTENATAL SCREENING FOR UNCERTAIN DATES: ICD-10-CM

## 2024-04-01 DIAGNOSIS — Z87.59 PERSONAL HISTORY OF OTHER COMPLICATIONS OF PREGNANCY, CHILDBIRTH AND THE PUERPERIUM: Primary | ICD-10-CM

## 2024-04-01 DIAGNOSIS — O09.521 SUPERVISION OF ELDERLY MULTIGRAVIDA, FIRST TRIMESTER: Primary | ICD-10-CM

## 2024-04-01 DIAGNOSIS — Z36.82 ENCOUNTER FOR ANTENATAL SCREENING FOR NUCHAL TRANSLUCENCY: ICD-10-CM

## 2024-04-05 ENCOUNTER — TELEPHONE (OUTPATIENT)
Dept: PRIMARY CARE | Facility: CLINIC | Age: 34
End: 2024-04-05
Payer: COMMERCIAL

## 2024-04-05 NOTE — TELEPHONE ENCOUNTER
Confirmation - Referral [id number]  Effective: 04/05/2024     Expires: 07/04/2024      Referred To  [facility]  Specialty: Dermatology  Tier 2  Group NPI: [id number]  Provider ID: [id number]  Tax ID: [id number]  This referral is valid at any location for the above group

## 2024-04-08 ENCOUNTER — APPOINTMENT (RX ONLY)
Dept: URBAN - METROPOLITAN AREA CLINIC 374 | Facility: CLINIC | Age: 34
Setting detail: DERMATOLOGY
End: 2024-04-08

## 2024-04-08 DIAGNOSIS — D18.0 HEMANGIOMA: ICD-10-CM | Status: UNCHANGED

## 2024-04-08 DIAGNOSIS — Z71.89 OTHER SPECIFIED COUNSELING: ICD-10-CM

## 2024-04-08 DIAGNOSIS — L82.1 OTHER SEBORRHEIC KERATOSIS: ICD-10-CM | Status: UNCHANGED

## 2024-04-08 DIAGNOSIS — L81.2 FRECKLES: ICD-10-CM | Status: UNCHANGED

## 2024-04-08 DIAGNOSIS — L81.4 OTHER MELANIN HYPERPIGMENTATION: ICD-10-CM | Status: UNCHANGED
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
## 2024-04-08 DIAGNOSIS — Z80.8 FAMILY HISTORY OF MALIGNANT NEOPLASM OF OTHER ORGANS OR SYSTEMS: ICD-10-CM

## 2024-04-08 DIAGNOSIS — D22 MELANOCYTIC NEVI: ICD-10-CM | Status: UNCHANGED

## 2024-04-08 PROBLEM — D18.01 HEMANGIOMA OF SKIN AND SUBCUTANEOUS TISSUE: Status: ACTIVE | Noted: 2024-04-08

## 2024-04-08 PROBLEM — D22.5 MELANOCYTIC NEVI OF TRUNK: Status: ACTIVE | Noted: 2024-04-08
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
## 2024-04-08 PROCEDURE — ? FULL BODY SKIN EXAM

## 2024-04-08 PROCEDURE — ? COUNSELING

## 2024-04-08 PROCEDURE — 99203 OFFICE O/P NEW LOW 30 MIN: CPT

## 2024-04-08 ASSESSMENT — LOCATION ZONE DERM
LOCATION ZONE: FACE
LOCATION ZONE: HAND
LOCATION ZONE: ARM
LOCATION ZONE: TRUNK

## 2024-04-08 ASSESSMENT — LOCATION DETAILED DESCRIPTION DERM
LOCATION DETAILED: SUPERIOR THORACIC SPINE
LOCATION DETAILED: LEFT RADIAL DORSAL HAND
LOCATION DETAILED: EPIGASTRIC SKIN
LOCATION DETAILED: RIGHT PROXIMAL DORSAL FOREARM
LOCATION DETAILED: RIGHT MID-UPPER BACK
LOCATION DETAILED: RIGHT MEDIAL UPPER BACK
LOCATION DETAILED: RIGHT CENTRAL MALAR CHEEK
LOCATION DETAILED: LEFT POSTERIOR SHOULDER
LOCATION DETAILED: LEFT PROXIMAL DORSAL FOREARM
LOCATION DETAILED: INFERIOR THORACIC SPINE
LOCATION DETAILED: RIGHT RADIAL DORSAL HAND
LOCATION DETAILED: LEFT INFERIOR MEDIAL MALAR CHEEK
LOCATION DETAILED: RIGHT POSTERIOR SHOULDER

## 2024-04-08 ASSESSMENT — LOCATION SIMPLE DESCRIPTION DERM
LOCATION SIMPLE: LEFT HAND
LOCATION SIMPLE: LEFT SHOULDER
LOCATION SIMPLE: LEFT CHEEK
LOCATION SIMPLE: UPPER BACK
LOCATION SIMPLE: RIGHT UPPER BACK
LOCATION SIMPLE: LEFT FOREARM
LOCATION SIMPLE: RIGHT CHEEK
LOCATION SIMPLE: RIGHT SHOULDER
LOCATION SIMPLE: RIGHT FOREARM
LOCATION SIMPLE: ABDOMEN
LOCATION SIMPLE: RIGHT HAND

## 2024-04-08 NOTE — HPI: EVALUATION OF SKIN LESION(S)
What Type Of Note Output Would You Prefer (Optional)?: Standard Output
Hpi Title: Evaluation of Skin Lesions
How Severe Are Your Spot(S)?: mild
Family Member: Father, grandmother, grandfather

## 2024-04-09 DIAGNOSIS — J01.00 SUBACUTE MAXILLARY SINUSITIS: Primary | ICD-10-CM

## 2024-04-09 RX ORDER — AMOXICILLIN AND CLAVULANATE POTASSIUM 875; 125 MG/1; MG/1
1 TABLET, FILM COATED ORAL 2 TIMES DAILY
Qty: 14 TABLET | Refills: 0 | Status: SHIPPED | OUTPATIENT
Start: 2024-04-09 | End: 2024-04-16

## 2024-04-15 ENCOUNTER — HOSPITAL ENCOUNTER (OUTPATIENT)
Dept: PERINATAL CARE | Facility: HOSPITAL | Age: 34
Discharge: HOME | End: 2024-04-15
Attending: NURSE PRACTITIONER
Payer: COMMERCIAL

## 2024-04-15 DIAGNOSIS — O36.80X0 ENCOUNTER TO DETERMINE FETAL VIABILITY OF PREGNANCY, SINGLE OR UNSPECIFIED FETUS: ICD-10-CM

## 2024-04-15 DIAGNOSIS — O09.91 SUPERVISION OF HIGH RISK PREGNANCY IN FIRST TRIMESTER: ICD-10-CM

## 2024-04-15 DIAGNOSIS — O09.521 SUPERVISION OF ELDERLY MULTIGRAVIDA, FIRST TRIMESTER: ICD-10-CM

## 2024-04-15 DIAGNOSIS — Z3A.09 9 WEEKS GESTATION OF PREGNANCY: Primary | ICD-10-CM

## 2024-04-15 PROCEDURE — 76801 OB US < 14 WKS SINGLE FETUS: CPT

## 2024-04-29 ENCOUNTER — TELEPHONE (OUTPATIENT)
Dept: PRIMARY CARE | Facility: CLINIC | Age: 34
End: 2024-04-29

## 2024-04-29 NOTE — TELEPHONE ENCOUNTER
Insurance Referral Request   Patient PCP: Dickson Desai MD  Insurance Carrier: N/A  Specialist Name:   Provider Specialty: Chiropractor  Provider NPI: [id number]  Office Location: [facility]  Reason for Visit or Diagnosis Code: m54.5  Appointment Date:     Insurance Referral will be submitted to Insurance within 2 business days.

## 2024-04-30 NOTE — TELEPHONE ENCOUNTER
Confirmation - Referral [id number]  Effective: 04/30/2024     Expires: 07/29/2024  \Referred To  [facility]  Specialty: Chiropractics  Tier 2  Group NPI: [id number]  Provider ID: [id number]  Tax ID: [id number]  This referral is valid at any location for the above group

## 2024-05-06 ENCOUNTER — TRANSCRIBE ORDERS (OUTPATIENT)
Dept: REGISTRATION | Facility: HOSPITAL | Age: 34
End: 2024-05-06

## 2024-05-06 ENCOUNTER — HOSPITAL ENCOUNTER (OUTPATIENT)
Dept: PERINATAL CARE | Facility: HOSPITAL | Age: 34
Discharge: HOME | End: 2024-05-06
Attending: NURSE PRACTITIONER
Payer: COMMERCIAL

## 2024-05-06 DIAGNOSIS — O09.291 PRIOR POOR OBSTETRICAL HISTORY IN FIRST TRIMESTER, ANTEPARTUM: Chronic | ICD-10-CM

## 2024-05-06 DIAGNOSIS — Z36.3 ENCOUNTER FOR ROUTINE SCREENING FOR MALFORMATION USING ULTRASONICS: ICD-10-CM

## 2024-05-06 DIAGNOSIS — Z36.82 ENCOUNTER FOR ANTENATAL SCREENING FOR NUCHAL TRANSLUCENCY: ICD-10-CM

## 2024-05-06 DIAGNOSIS — Z36.3 ENCOUNTER FOR ANTENATAL SCREENING FOR MALFORMATIONS: Primary | ICD-10-CM

## 2024-05-06 DIAGNOSIS — O09.91 SUPERVISION OF HIGH RISK PREGNANCY IN FIRST TRIMESTER: Primary | Chronic | ICD-10-CM

## 2024-05-06 DIAGNOSIS — Z3A.12 12 WEEKS GESTATION OF PREGNANCY: ICD-10-CM

## 2024-05-06 PROCEDURE — 76813 OB US NUCHAL MEAS 1 GEST: CPT

## 2024-05-16 ENCOUNTER — TELEPHONE (OUTPATIENT)
Dept: PRIMARY CARE | Facility: CLINIC | Age: 34
End: 2024-05-16
Payer: COMMERCIAL

## 2024-05-16 NOTE — TELEPHONE ENCOUNTER
I called and LVM for  patient to get more information regarding the Ultrasound and who is the ordering provider.       Waiting for a return call

## 2024-05-16 NOTE — TELEPHONE ENCOUNTER
Pt LVM regarding her pregnancy and  US. Her insurance capitates her to get her US done at [facility]. This is an hour away from her. Insurance stated they just need an auth for out of capitation. She would like to go to [facility]. Pt is asking for this to be done asap as she needs to make appointments.

## 2024-05-20 NOTE — TELEPHONE ENCOUNTER
I called and started a out of cap prior Auth for US. CPT codes 50221, 24315, dx code Z36.3    [facility] [phone]    Clinicals need to be faxed over for out of cap Auth. (If patient decides to go to [facility] no Auth is required)     Fax [phone]    Pending Auth number [id number]  Effective  05/20/24 06/18/2024    Patient is aware and all information given to patient to have her OB fax clinicals over. Patient will call me back to notify me once clinicals are faxed.

## 2024-05-22 ENCOUNTER — TRANSCRIBE ORDERS (OUTPATIENT)
Dept: SCHEDULING | Age: 34
End: 2024-05-22

## 2024-05-22 DIAGNOSIS — Z36.3 ENCOUNTER FOR ANTENATAL SCREENING FOR MALFORMATIONS: Primary | ICD-10-CM

## 2024-05-22 NOTE — TELEPHONE ENCOUNTER
I called member services to verify authorization and receipt of clinical notes.     Prior Auth is complete and patient ok to call to scheduled for [place] location.      Call reference # [id number]

## 2024-06-26 ENCOUNTER — HOSPITAL ENCOUNTER (OUTPATIENT)
Dept: PERINATAL CARE | Facility: HOSPITAL | Age: 34
Discharge: HOME | End: 2024-06-26
Attending: NURSE PRACTITIONER
Payer: COMMERCIAL

## 2024-06-26 DIAGNOSIS — Z3A.20 20 WEEKS GESTATION OF PREGNANCY: ICD-10-CM

## 2024-06-26 DIAGNOSIS — O09.292 PRIOR POOR OBSTETRICAL HISTORY IN SECOND TRIMESTER, ANTEPARTUM: Primary | ICD-10-CM

## 2024-06-26 DIAGNOSIS — Z36.3 ENCOUNTER FOR ANTENATAL SCREENING FOR MALFORMATIONS: ICD-10-CM

## 2024-06-26 PROCEDURE — 76811 OB US DETAILED SNGL FETUS: CPT

## 2024-08-20 ENCOUNTER — HOSPITAL ENCOUNTER (OUTPATIENT)
Dept: PERINATAL CARE | Facility: HOSPITAL | Age: 34
Discharge: HOME | End: 2024-08-20
Attending: NURSE PRACTITIONER
Payer: COMMERCIAL

## 2024-08-20 DIAGNOSIS — O09.523 SUPERVISION OF ELDERLY MULTIGRAVIDA IN THIRD TRIMESTER: ICD-10-CM

## 2024-08-20 DIAGNOSIS — Z3A.28 28 WEEKS GESTATION OF PREGNANCY: ICD-10-CM

## 2024-08-20 DIAGNOSIS — O09.93 SUPERVISION OF HIGH RISK PREGNANCY IN THIRD TRIMESTER: Primary | ICD-10-CM

## 2024-08-20 DIAGNOSIS — O09.293 PREGNANCY WITH PRIOR ADVERSE OUTCOME IN THIRD TRIMESTER: ICD-10-CM

## 2024-08-20 PROCEDURE — 76816 OB US FOLLOW-UP PER FETUS: CPT

## 2024-08-21 ENCOUNTER — TELEPHONE (OUTPATIENT)
Dept: PRIMARY CARE | Facility: CLINIC | Age: 34
End: 2024-08-21
Payer: COMMERCIAL

## 2024-08-21 ENCOUNTER — TELEPHONE (OUTPATIENT)
Dept: SCHEDULING | Facility: CLINIC | Age: 34
End: 2024-08-21
Payer: COMMERCIAL

## 2024-08-21 NOTE — TELEPHONE ENCOUNTER
Patient requesting an Apt with . Patient asked for NPI and diagnosis code which was given, Please Advise on scheduling P:[phone]

## 2024-08-21 NOTE — TELEPHONE ENCOUNTER
Insurance Referral Request   Patient PCP: Dickson Desai MD  Insurance Carrier: [facility] Kelsey SMITH  Specialist Name: travis Dietz  Provider Specialty: Cardiologist  Provider NPI: [id number]  Office Location: [facility]  Reason for Visit or Diagnosis Code: Z01.810  Appointment Date: not yet    Insurance Referral will be submitted to Insurance within 2 business days.

## 2024-08-24 LAB — T PALLIDUM AB SER QL IF: NONREACTIVE

## 2024-09-05 ASSESSMENT — ENCOUNTER SYMPTOMS
CHILLS: 0
DYSPNEA ON EXERTION: 0
CLAUDICATION: 0
FEVER: 0
SYNCOPE: 0
SHORTNESS OF BREATH: 0
IRREGULAR HEARTBEAT: 0
SLEEP DISTURBANCES DUE TO BREATHING: 0
ORTHOPNEA: 0
PALPITATIONS: 1
DIZZINESS: 0
LIGHT-HEADEDNESS: 0
PND: 0
NEAR-SYNCOPE: 0

## 2024-09-09 ENCOUNTER — OFFICE VISIT (OUTPATIENT)
Dept: CARDIOLOGY | Facility: CLINIC | Age: 34
End: 2024-09-09
Payer: COMMERCIAL

## 2024-09-09 ENCOUNTER — APPOINTMENT (OUTPATIENT)
Dept: CARDIOLOGY | Facility: CLINIC | Age: 34
End: 2024-09-09
Attending: NURSE PRACTITIONER
Payer: COMMERCIAL

## 2024-09-09 VITALS
SYSTOLIC BLOOD PRESSURE: 100 MMHG | RESPIRATION RATE: 16 BRPM | HEIGHT: 64 IN | HEART RATE: 118 BPM | OXYGEN SATURATION: 99 % | WEIGHT: 151 LBS | BODY MASS INDEX: 25.78 KG/M2 | DIASTOLIC BLOOD PRESSURE: 70 MMHG

## 2024-09-09 DIAGNOSIS — Z3A.31 31 WEEKS GESTATION OF PREGNANCY: ICD-10-CM

## 2024-09-09 DIAGNOSIS — R00.2 PALPITATIONS: Primary | ICD-10-CM

## 2024-09-09 DIAGNOSIS — R00.2 PALPITATIONS: ICD-10-CM

## 2024-09-09 PROBLEM — Z3A.38 38 WEEKS GESTATION OF PREGNANCY: Status: ACTIVE | Noted: 2024-09-09

## 2024-09-09 PROCEDURE — 99214 OFFICE O/P EST MOD 30 MIN: CPT | Performed by: NURSE PRACTITIONER

## 2024-09-09 PROCEDURE — 93000 ELECTROCARDIOGRAM COMPLETE: CPT | Performed by: NURSE PRACTITIONER

## 2024-09-09 ASSESSMENT — ENCOUNTER SYMPTOMS
LIGHT-HEADEDNESS: 0
DYSPNEA ON EXERTION: 0
CLAUDICATION: 0
DIZZINESS: 0
SHORTNESS OF BREATH: 0
CHILLS: 0
NEAR-SYNCOPE: 0
SYNCOPE: 0
FEVER: 0
ORTHOPNEA: 0
PALPITATIONS: 1
SLEEP DISTURBANCES DUE TO BREATHING: 0
PND: 0
IRREGULAR HEARTBEAT: 0

## 2024-09-09 NOTE — ASSESSMENT & PLAN NOTE
H/O palpitations--worsening with pregnancy  Currently 31 weeks pregnant and notes increase in symptoms over the past couple weeks.  Past holter monitors have noted sinus tachy, rare PVC, rare PAC. Most recent holter 12/8/22.  Currently she is hydrating with 1-2c coffee/day and about 60 oz water/day  HR escalates when standing  Recommend: hydration with 80 oz decaf fluid/day and add electrolyte beverage.  Check TSH/CMP/CBC/Mg  48h holter  Severe symptoms, to ER  EKG in office today shows ST at a rate of 102-104  She has no symptoms to suggest SVT  Follow up in 4 weeks, sooner if needed

## 2024-09-09 NOTE — ASSESSMENT & PLAN NOTE
Pregnancy has been progressing without complication  Palpitations as noted  No h/o  preeclampsia, DM/HTN  Follows with [facility]

## 2024-09-09 NOTE — PATIENT INSTRUCTIONS
Please get labwork    Please limit caffeine to 1/day    Bump water to 80 oz/day  Try an electrolyte beverage daily    Severe symptoms, to ER    Wear monitor for 48h    Try athletic support socks during the day

## 2024-09-09 NOTE — PROGRESS NOTES
"  24    Adelia Contreras is a 33 y.o. female with a history of palpitations.  She was seen in initial cardio-obstetrics consultation by Dr. Dietz on 2022 and subsequently by KERRY Sumner on two occasions, most recently 2023. Palpitations have been stable.    As you may recall, Adelia established care with our office for evaluation of increased palpitations in her third trimester.  Conservative measures were recommended at that time.  Palpitations increased postpartum in the setting of caring for her  with little sleep.  Holter monitor without concerning arrhythmias and conservative measures continued.    Adelia presents today for follow-up.  She is 30w6/7 pregnant.    31 weeks pregnant  Hydration - about 60 oz water  1 c coffee    \"100s of palpitations/day\"--flipflopping, intermittent  Happening for a couple weeks  No SOB/lightheadedness/dizziness  Feels like heart doing a somersault    Gets some DACOSTA on 1 flight stairs--nothing unusual for her when pregnant  Prenatal labs done  [facility]  2nd pregnancy  Other child is 2 yr 1 mo  No formal exercise but running around after toddler  Working primarily remotely as forensic psychologist    Has had  labs--unable to view any in her chart.    OB Provider[facility]      Past Medical History:  1.  Palpitations  2.  Obstetric history:     Past Surgical History:   1. Myringotomy with tube placement    Medications: prenatal multivite    Allergies: Patient has no known allergies.    Social History: Never smoker. No recent alcohol use. 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     Family History: Palpitations in father, paternal grandmother and grandfather.  Grandmother afib    Exam:  Objective      .Review of Systems   Constitutional: Negative for chills and fever.   Cardiovascular:  Positive for palpitations. Negative for chest pain, claudication, cyanosis, " "dyspnea on exertion, irregular heartbeat, leg swelling, near-syncope, orthopnea, paroxysmal nocturnal dyspnea and syncope.   Respiratory:  Negative for shortness of breath and sleep disturbances due to breathing.    Neurological:  Negative for dizziness and light-headedness.   All other systems reviewed and are negative.    Vitals:    09/09/24 1019 09/09/24 1046   BP: 102/70 100/70   Patient Position:  Standing   Pulse: (!) 102 (!) 118   Resp: 16    SpO2: 99%    Weight: 68.5 kg (151 lb)    Height: 1.626 m (5' 4\")           Wt Readings from Last 5 Encounters:   09/09/24 68.5 kg (151 lb)   03/26/24 59 kg (130 lb)   03/09/23 56.7 kg (125 lb)   03/07/23 56.9 kg (125 lb 8 oz)   12/08/22 57.6 kg (127 lb)     Body mass index is 25.92 kg/m².     Physical Exam  Vitals reviewed.   Constitutional:       General: She is not in acute distress.     Appearance: Normal appearance. She is well-developed.      Comments: Pregnant female in NAD   HENT:      Head: Normocephalic.      Right Ear: External ear normal.      Left Ear: External ear normal.      Nose: Nose normal.      Mouth/Throat:      Mouth: Mucous membranes are not cyanotic.   Eyes:      Conjunctiva/sclera: Conjunctivae normal.   Neck:      Vascular: No carotid bruit or JVD.   Cardiovascular:      Rate and Rhythm: Normal rate and regular rhythm. No extrasystoles are present.     Pulses: Normal pulses.      Heart sounds: Normal heart sounds, S1 normal and S2 normal.   Pulmonary:      Effort: Pulmonary effort is normal.      Breath sounds: Normal breath sounds.   Abdominal:      General: Bowel sounds are normal.      Tenderness: There is no abdominal tenderness.      Comments: pregnant   Musculoskeletal:         General: Normal range of motion.      Cervical back: Normal range of motion and neck supple.      Right lower leg: No edema.      Left lower leg: No edema.   Skin:     General: Skin is warm and dry.      Capillary Refill: Capillary refill takes less than 2 seconds. "   Neurological:      General: No focal deficit present.      Mental Status: She is alert and oriented to person, place, and time.   Psychiatric:         Mood and Affect: Mood normal.         Speech: Speech normal.         Behavior: Behavior normal.       Labs:  Lab Results   Component Value Date    WBC 9.33 07/16/2022    HGB 11.8 07/16/2022     07/16/2022     No recent labs available for review.    Cardiovascular Studies:   HOLTER MONITOR 12/08/2022  1. The patient was monitor for 24 hours.  2. The predominant rhythm was sinus rhythm.  3. The average heart rate was 91 bpm.  4. The minimum heart rate was 64 bpm.  5. The maximum heart rate was 142 bpm.  6. There was 1 supraventricular beat.  7. There were 19 premature ventricular beats.  8. Patient logged activities without symptoms.  HOLTER MONITOR 08/02/2017 (report from Care Everywhere)  The predominant cardiac rhythm was sinus rhythm with intermittent sinus tachycardia. The average heart rate was 88 bpm (ranging from 63 bpm to 139 bpm) throughout the 24 hour study.   No ventricular ectopy was noted during the study.   Supraventricular ectopy consisted of very rare PACs.   The patient was tachycardic for 4.0 hours of the 24 hour study.   The patient did not return the holter diary.   24-hour average was high at 88 BPM.    No ventricular ectopy and just 2 PAC's in the 24 hours.       ECG: from today- sinus tachycardia      Assessment/Plan   Problem List Items Addressed This Visit       Palpitations - Primary     H/O palpitations--worsening with pregnancy  Currently 31 weeks pregnant and notes increase in symptoms over the past couple weeks.  Past holter monitors have noted sinus tachy, rare PVC, rare PAC. Most recent holter 12/8/22.  Currently she is hydrating with 1-2c coffee/day and about 60 oz water/day  HR escalates when standing  Recommend: hydration with 80 oz decaf fluid/day and add electrolyte beverage.  Check TSH/CMP/CBC/Mg  48h holter  Severe  symptoms, to ER  EKG in office today shows ST at a rate of 102-104  She has no symptoms to suggest SVT  Follow up in 4 weeks, sooner if needed              Relevant Orders    [facility] MUSE ECG 12 lead (clinic performed) (Completed)    TSH w reflex FT4    Comprehensive metabolic panel    Magnesium    CBC and differential    [facility]HG GE Holter monitor - 48 hour    31 weeks gestation of pregnancy     Pregnancy has been progressing without complication  Palpitations as noted  No h/o  preeclampsia, DM/HTN  Follows with [facility]          Thank you for allowing me to participate in the care of this patient.  Please feel free to call with questions.    Disclaimer: This note was generated using speech recognition software.  A reasonable attempt has been made at proofreading.  Please disregard any obvious grammatical or typographical errors.      Return in about 4 weeks (around 10/7/2024).    Will set her up for 4 week follow up with KERRY Macias, DNP. Patient advised if things are quiet and she does not need appt, ok to cancel.        KERRY Lopez  09/09/24

## 2024-09-10 NOTE — PROGRESS NOTES
I was immediately available to provide supervision and direction for the care of the patient.  Cleo Dietz MD

## 2024-09-11 PROCEDURE — 93224 XTRNL ECG REC UP TO 48 HRS: CPT | Performed by: NURSE PRACTITIONER

## 2024-09-12 LAB
ALBUMIN SERPL-MCNC: 3.6 G/DL (ref 3.9–4.9)
ALP SERPL-CCNC: 106 IU/L (ref 44–121)
ALT SERPL-CCNC: 8 IU/L (ref 0–32)
AST SERPL-CCNC: 14 IU/L (ref 0–40)
BASOPHILS # BLD AUTO: 0 X10E3/UL (ref 0–0.2)
BASOPHILS NFR BLD AUTO: 0 %
BILIRUB SERPL-MCNC: <0.2 MG/DL (ref 0–1.2)
BUN SERPL-MCNC: 6 MG/DL (ref 6–20)
BUN/CREAT SERPL: 11 (ref 9–23)
CALCIUM SERPL-MCNC: 9 MG/DL (ref 8.7–10.2)
CHLORIDE SERPL-SCNC: 104 MMOL/L (ref 96–106)
CO2 SERPL-SCNC: 21 MMOL/L (ref 20–29)
CREAT SERPL-MCNC: 0.53 MG/DL (ref 0.57–1)
EGFRCR SERPLBLD CKD-EPI 2021: 124 ML/MIN/1.73
EOSINOPHIL # BLD AUTO: 0.2 X10E3/UL (ref 0–0.4)
EOSINOPHIL NFR BLD AUTO: 2 %
ERYTHROCYTE [DISTWIDTH] IN BLOOD BY AUTOMATED COUNT: 11.4 % (ref 11.7–15.4)
GLOBULIN SER CALC-MCNC: 2.6 G/DL (ref 1.5–4.5)
GLUCOSE SERPL-MCNC: 109 MG/DL (ref 70–99)
HCT VFR BLD AUTO: 32.1 % (ref 34–46.6)
HGB BLD-MCNC: 10.5 G/DL (ref 11.1–15.9)
IMM GRANULOCYTES # BLD AUTO: 0 X10E3/UL (ref 0–0.1)
IMM GRANULOCYTES NFR BLD AUTO: 0 %
LYMPHOCYTES # BLD AUTO: 1.5 X10E3/UL (ref 0.7–3.1)
LYMPHOCYTES NFR BLD AUTO: 19 %
MAGNESIUM SERPL-MCNC: 1.9 MG/DL (ref 1.6–2.3)
MCH RBC QN AUTO: 30.4 PG (ref 26.6–33)
MCHC RBC AUTO-ENTMCNC: 32.7 G/DL (ref 31.5–35.7)
MCV RBC AUTO: 93 FL (ref 79–97)
MONOCYTES # BLD AUTO: 0.6 X10E3/UL (ref 0.1–0.9)
MONOCYTES NFR BLD AUTO: 7 %
NEUTROPHILS # BLD AUTO: 5.9 X10E3/UL (ref 1.4–7)
NEUTROPHILS NFR BLD AUTO: 72 %
PLATELET # BLD AUTO: 269 X10E3/UL (ref 150–450)
POTASSIUM SERPL-SCNC: 4.1 MMOL/L (ref 3.5–5.2)
PROT SERPL-MCNC: 6.2 G/DL (ref 6–8.5)
RBC # BLD AUTO: 3.45 X10E6/UL (ref 3.77–5.28)
SODIUM SERPL-SCNC: 137 MMOL/L (ref 134–144)
T4 FREE SERPL-MCNC: 0.73 NG/DL (ref 0.82–1.77)
TSH SERPL DL<=0.005 MIU/L-ACNC: 0.9 UIU/ML (ref 0.45–4.5)
WBC # BLD AUTO: 8.2 X10E3/UL (ref 3.4–10.8)

## 2024-09-16 LAB
ATRIAL RATE: 106
P AXIS: 54
PR INTERVAL: 122
QRS DURATION: 72
QT INTERVAL: 314
QTC CALCULATION(BAZETT): 417
R AXIS: 27
T WAVE AXIS: 21
VENTRICULAR RATE: 106

## 2024-10-02 ENCOUNTER — HOSPITAL ENCOUNTER (OUTPATIENT)
Dept: PERINATAL CARE | Facility: HOSPITAL | Age: 34
Discharge: HOME | End: 2024-10-02
Attending: NURSE PRACTITIONER
Payer: COMMERCIAL

## 2024-10-02 DIAGNOSIS — Z3A.34 34 WEEKS GESTATION OF PREGNANCY: ICD-10-CM

## 2024-10-02 DIAGNOSIS — O09.293 PRIOR POOR OBSTETRICAL HISTORY IN THIRD TRIMESTER, ANTEPARTUM: Chronic | ICD-10-CM

## 2024-10-02 DIAGNOSIS — O09.93 SUPERVISION OF HIGH RISK PREGNANCY IN THIRD TRIMESTER: Primary | Chronic | ICD-10-CM

## 2024-10-02 PROCEDURE — 76816 OB US FOLLOW-UP PER FETUS: CPT

## 2024-10-08 ENCOUNTER — TELEPHONE (OUTPATIENT)
Dept: PRIMARY CARE | Facility: CLINIC | Age: 34
End: 2024-10-08

## 2024-10-08 NOTE — TELEPHONE ENCOUNTER
[facility] Appointment Request   Provider: Dr. Desai  Appointment Type: Diagnostic services  Reason for Visit: RSV vaccine  Available Day and Time: asap, Pt is 35 weeks pregnant & needs it before she hits 36 weeks  Best Contact Number: [phone]    The practice will reach out to schedule your appointment within the next 2 business days.

## 2024-10-09 ENCOUNTER — CLINICAL SUPPORT (OUTPATIENT)
Dept: PRIMARY CARE | Facility: CLINIC | Age: 34
End: 2024-10-09
Payer: COMMERCIAL

## 2024-10-09 DIAGNOSIS — Z29.11 NEED FOR RSV VACCINATION: ICD-10-CM

## 2024-10-09 PROCEDURE — 99999 PR OFFICE/OUTPT VISIT,PROCEDURE ONLY: CPT | Performed by: STUDENT IN AN ORGANIZED HEALTH CARE EDUCATION/TRAINING PROGRAM

## 2024-10-17 ENCOUNTER — DOCUMENTATION (OUTPATIENT)
Dept: PRIMARY CARE | Facility: CLINIC | Age: 34
End: 2024-10-17
Payer: COMMERCIAL

## 2024-10-17 NOTE — PROGRESS NOTES
Adelia Contreras was scheduled for and received RSV vaccination on October 9, 2024.    Unfortunately, she received the wrong RSV vaccine.     She was given Arexvy which is not approved for use in pregnancy.    On October 10, 2024 I called the patient and made her aware of the error.      We talked in depth about the potential adverse effects.    We did discuss that the baby will need to receive the RSV monoclonal antibody Nirsevimab shortly before/during the first RSV season for RSV prevention.    She will make her obstetrician aware.    All questions were answered to the best of my ability.     Dr. Desai

## 2024-10-18 ENCOUNTER — TRANSCRIBE ORDERS (OUTPATIENT)
Dept: REGISTRATION | Facility: CLINIC | Age: 34
End: 2024-10-18

## 2024-10-18 DIAGNOSIS — O12.00 GESTATIONAL EDEMA, UNSPECIFIED TRIMESTER: Primary | ICD-10-CM

## 2024-10-21 LAB — GP B STREP SPEC QL CULT: ABNORMAL

## 2024-10-22 ENCOUNTER — HOSPITAL ENCOUNTER (OUTPATIENT)
Dept: RADIOLOGY | Facility: CLINIC | Age: 34
Discharge: HOME | End: 2024-10-22
Attending: OBSTETRICS & GYNECOLOGY
Payer: COMMERCIAL

## 2024-10-22 DIAGNOSIS — O12.00 GESTATIONAL EDEMA, UNSPECIFIED TRIMESTER: ICD-10-CM

## 2024-10-22 PROCEDURE — 93971 EXTREMITY STUDY: CPT | Mod: RT

## 2024-11-04 ENCOUNTER — HOSPITAL ENCOUNTER (INPATIENT)
Facility: HOSPITAL | Age: 34
LOS: 1 days | Discharge: HOME | End: 2024-11-06
Attending: OBSTETRICS & GYNECOLOGY | Admitting: OBSTETRICS & GYNECOLOGY
Payer: COMMERCIAL

## 2024-11-05 ENCOUNTER — ANESTHESIA (INPATIENT)
Dept: OBSTETRICS AND GYNECOLOGY | Facility: HOSPITAL | Age: 34
End: 2024-11-05
Payer: COMMERCIAL

## 2024-11-05 ENCOUNTER — ANESTHESIA EVENT (INPATIENT)
Dept: OBSTETRICS AND GYNECOLOGY | Facility: HOSPITAL | Age: 34
End: 2024-11-05
Payer: COMMERCIAL

## 2024-11-05 LAB
ABO + RH BLD: NORMAL
BLD GP AB SCN SERPL QL: NEGATIVE
D AG BLD QL: NEGATIVE
ERYTHROCYTE [DISTWIDTH] IN BLOOD BY AUTOMATED COUNT: 12.2 % (ref 11.7–14.4)
HBV SURFACE AG SER QL: NONREACTIVE
HCT VFR BLD AUTO: 33.3 % (ref 35–45)
HGB BLD-MCNC: 10.9 G/DL (ref 11.8–15.7)
LABORATORY COMMENT REPORT: NORMAL
MCH RBC QN AUTO: 30 PG (ref 28–33.2)
MCHC RBC AUTO-ENTMCNC: 32.7 G/DL (ref 32.2–35.5)
MCV RBC AUTO: 91.7 FL (ref 83–98)
PLATELET # BLD AUTO: 225 K/UL (ref 150–369)
PMV BLD AUTO: 11.2 FL (ref 9.4–12.3)
RBC # BLD AUTO: 3.63 M/UL (ref 3.93–5.22)
SPECIMEN EXP DATE BLD: NORMAL
T PALLIDUM AB SER QL IF: NONREACTIVE
WBC # BLD AUTO: 9.21 K/UL (ref 3.8–10.5)

## 2024-11-05 PROCEDURE — 63700000 HC SELF-ADMINISTRABLE DRUG: Performed by: OBSTETRICS & GYNECOLOGY

## 2024-11-05 PROCEDURE — 36415 COLL VENOUS BLD VENIPUNCTURE: CPT | Performed by: STUDENT IN AN ORGANIZED HEALTH CARE EDUCATION/TRAINING PROGRAM

## 2024-11-05 PROCEDURE — 3E033VJ INTRODUCTION OF OTHER HORMONE INTO PERIPHERAL VEIN, PERCUTANEOUS APPROACH: ICD-10-PCS | Performed by: STUDENT IN AN ORGANIZED HEALTH CARE EDUCATION/TRAINING PROGRAM

## 2024-11-05 PROCEDURE — 25000000 HC PHARMACY GENERAL: Performed by: OBSTETRICS & GYNECOLOGY

## 2024-11-05 PROCEDURE — 25800000 HC PHARMACY IV SOLUTIONS: Performed by: STUDENT IN AN ORGANIZED HEALTH CARE EDUCATION/TRAINING PROGRAM

## 2024-11-05 PROCEDURE — 25000000 HC PHARMACY GENERAL: Performed by: ANESTHESIOLOGY

## 2024-11-05 PROCEDURE — 85027 COMPLETE CBC AUTOMATED: CPT | Performed by: STUDENT IN AN ORGANIZED HEALTH CARE EDUCATION/TRAINING PROGRAM

## 2024-11-05 PROCEDURE — 87340 HEPATITIS B SURFACE AG IA: CPT | Performed by: STUDENT IN AN ORGANIZED HEALTH CARE EDUCATION/TRAINING PROGRAM

## 2024-11-05 PROCEDURE — 86780 TREPONEMA PALLIDUM: CPT | Performed by: STUDENT IN AN ORGANIZED HEALTH CARE EDUCATION/TRAINING PROGRAM

## 2024-11-05 PROCEDURE — 88304 TISSUE EXAM BY PATHOLOGIST: CPT | Performed by: OBSTETRICS & GYNECOLOGY

## 2024-11-05 PROCEDURE — 86900 BLOOD TYPING SEROLOGIC ABO: CPT

## 2024-11-05 PROCEDURE — 37000005 HC ANESTHESIA EPIDURAL/SPINAL: Performed by: ANESTHESIOLOGY

## 2024-11-05 PROCEDURE — 63600000 HC DRUGS/DETAIL CODE: Mod: TB | Performed by: ANESTHESIOLOGY

## 2024-11-05 PROCEDURE — 12000000 HC ROOM AND CARE MED/SURG

## 2024-11-05 PROCEDURE — 27200130 HC EPIDURAL ANES TRAY

## 2024-11-05 PROCEDURE — 72000011 HC VAGINAL DELIVERY LEVEL 1

## 2024-11-05 PROCEDURE — 63600000 HC DRUGS/DETAIL CODE: Mod: JZ | Performed by: STUDENT IN AN ORGANIZED HEALTH CARE EDUCATION/TRAINING PROGRAM

## 2024-11-05 RX ORDER — CARBOPROST TROMETHAMINE 250 UG/ML
250 INJECTION, SOLUTION INTRAMUSCULAR ONCE AS NEEDED
Status: DISCONTINUED | OUTPATIENT
Start: 2024-11-05 | End: 2024-11-06

## 2024-11-05 RX ORDER — EPHEDRINE SULFATE/0.9% NACL/PF 50 MG/5 ML
10 SYRINGE (ML) INTRAVENOUS EVERY 10 MIN PRN
Status: DISCONTINUED | OUTPATIENT
Start: 2024-11-05 | End: 2024-11-05 | Stop reason: HOSPADM

## 2024-11-05 RX ORDER — SILVER NITRATE 38.21; 12.74 MG/1; MG/1
STICK TOPICAL
Status: DISPENSED
Start: 2024-11-05 | End: 2024-11-05

## 2024-11-05 RX ORDER — METHYLERGONOVINE MALEATE 0.2 MG/ML
0.2 INJECTION INTRAVENOUS ONCE AS NEEDED
Status: DISCONTINUED | OUTPATIENT
Start: 2024-11-05 | End: 2024-11-06

## 2024-11-05 RX ORDER — DIPHENHYDRAMINE HCL 50 MG/ML
25 VIAL (ML) INJECTION EVERY 6 HOURS PRN
Status: DISCONTINUED | OUTPATIENT
Start: 2024-11-05 | End: 2024-11-06 | Stop reason: HOSPADM

## 2024-11-05 RX ORDER — ONDANSETRON HYDROCHLORIDE 2 MG/ML
4 INJECTION, SOLUTION INTRAVENOUS EVERY 8 HOURS PRN
Status: DISCONTINUED | OUTPATIENT
Start: 2024-11-05 | End: 2024-11-06 | Stop reason: HOSPADM

## 2024-11-05 RX ORDER — OXYTOCIN 10 [USP'U]/ML
10 INJECTION, SOLUTION INTRAMUSCULAR; INTRAVENOUS ONCE
Status: CANCELLED | OUTPATIENT
Start: 2024-11-05 | End: 2024-11-05

## 2024-11-05 RX ORDER — ONDANSETRON HYDROCHLORIDE 2 MG/ML
4 INJECTION, SOLUTION INTRAVENOUS EVERY 8 HOURS PRN
Status: CANCELLED | OUTPATIENT
Start: 2024-11-05

## 2024-11-05 RX ORDER — DIBUCAINE 1 %
1 OINTMENT (GRAM) TOPICAL AS NEEDED
Status: DISCONTINUED | OUTPATIENT
Start: 2024-11-05 | End: 2024-11-06 | Stop reason: HOSPADM

## 2024-11-05 RX ORDER — OXYTOCIN 10 [USP'U]/ML
10 INJECTION, SOLUTION INTRAMUSCULAR; INTRAVENOUS ONCE AS NEEDED
Status: COMPLETED | OUTPATIENT
Start: 2024-11-05 | End: 2024-11-05

## 2024-11-05 RX ORDER — DIPHENHYDRAMINE HCL 50 MG/ML
25 VIAL (ML) INJECTION EVERY 6 HOURS PRN
Status: CANCELLED | OUTPATIENT
Start: 2024-11-05

## 2024-11-05 RX ORDER — MISOPROSTOL 200 UG/1
1000 TABLET ORAL ONCE AS NEEDED
Status: DISCONTINUED | OUTPATIENT
Start: 2024-11-05 | End: 2024-11-06

## 2024-11-05 RX ORDER — ACETAMINOPHEN 325 MG/1
650 TABLET ORAL ONCE
Status: COMPLETED | OUTPATIENT
Start: 2024-11-05 | End: 2024-11-05

## 2024-11-05 RX ORDER — DIPHENHYDRAMINE HCL 25 MG
25 CAPSULE ORAL EVERY 6 HOURS PRN
Status: CANCELLED | OUTPATIENT
Start: 2024-11-05

## 2024-11-05 RX ORDER — DIPHENHYDRAMINE HCL 25 MG
25 CAPSULE ORAL EVERY 6 HOURS PRN
Status: DISCONTINUED | OUTPATIENT
Start: 2024-11-05 | End: 2024-11-05

## 2024-11-05 RX ORDER — ONDANSETRON 4 MG/1
4 TABLET, ORALLY DISINTEGRATING ORAL EVERY 8 HOURS PRN
Status: DISCONTINUED | OUTPATIENT
Start: 2024-11-05 | End: 2024-11-06 | Stop reason: HOSPADM

## 2024-11-05 RX ORDER — DIBUCAINE 1 %
1 OINTMENT (GRAM) TOPICAL AS NEEDED
Status: CANCELLED | OUTPATIENT
Start: 2024-11-05 | End: 2025-02-03

## 2024-11-05 RX ORDER — CALCIUM CARBONATE 200(500)MG
200 TABLET,CHEWABLE ORAL EVERY 4 HOURS PRN
Status: CANCELLED | OUTPATIENT
Start: 2024-11-05

## 2024-11-05 RX ORDER — AMOXICILLIN 250 MG
1 CAPSULE ORAL 2 TIMES DAILY
Status: DISCONTINUED | OUTPATIENT
Start: 2024-11-05 | End: 2024-11-06 | Stop reason: HOSPADM

## 2024-11-05 RX ORDER — IBUPROFEN 600 MG/1
600 TABLET ORAL EVERY 6 HOURS PRN
Status: DISCONTINUED | OUTPATIENT
Start: 2024-11-05 | End: 2024-11-06 | Stop reason: HOSPADM

## 2024-11-05 RX ORDER — TRANEXAMIC ACID 10 MG/ML
1000 INJECTION, SOLUTION INTRAVENOUS ONCE AS NEEDED
Status: DISCONTINUED | OUTPATIENT
Start: 2024-11-05 | End: 2024-11-06

## 2024-11-05 RX ORDER — OXYTOCIN/0.9 % SODIUM CHLORIDE 30/500 ML
0-667 PLASTIC BAG, INJECTION (ML) INTRAVENOUS CONTINUOUS
Status: DISCONTINUED | OUTPATIENT
Start: 2024-11-05 | End: 2024-11-06

## 2024-11-05 RX ORDER — ACETAMINOPHEN 325 MG/1
650 TABLET ORAL EVERY 4 HOURS PRN
Status: DISCONTINUED | OUTPATIENT
Start: 2024-11-05 | End: 2024-11-06 | Stop reason: HOSPADM

## 2024-11-05 RX ORDER — CALCIUM CARBONATE 200(500)MG
200 TABLET,CHEWABLE ORAL EVERY 4 HOURS PRN
Status: DISCONTINUED | OUTPATIENT
Start: 2024-11-05 | End: 2024-11-06 | Stop reason: HOSPADM

## 2024-11-05 RX ORDER — AMOXICILLIN 250 MG
1 CAPSULE ORAL 2 TIMES DAILY
Status: CANCELLED | OUTPATIENT
Start: 2024-11-05 | End: 2025-02-03

## 2024-11-05 RX ORDER — FENTANYL/ROPIVACAINE/NS/PF 2-1500 MCG
PREFILLED PUMP RESERVOIR INJECTION CONTINUOUS
Status: DISCONTINUED | OUTPATIENT
Start: 2024-11-05 | End: 2024-11-06

## 2024-11-05 RX ORDER — IBUPROFEN 600 MG/1
600 TABLET ORAL EVERY 6 HOURS PRN
Status: CANCELLED | OUTPATIENT
Start: 2024-11-05 | End: 2025-02-03

## 2024-11-05 RX ORDER — ALUMINUM HYDROXIDE, MAGNESIUM HYDROXIDE, AND SIMETHICONE 1200; 120; 1200 MG/30ML; MG/30ML; MG/30ML
30 SUSPENSION ORAL EVERY 4 HOURS PRN
Status: DISCONTINUED | OUTPATIENT
Start: 2024-11-05 | End: 2024-11-06 | Stop reason: HOSPADM

## 2024-11-05 RX ORDER — DIPHENHYDRAMINE HCL 25 MG
25 CAPSULE ORAL EVERY 6 HOURS PRN
Status: DISCONTINUED | OUTPATIENT
Start: 2024-11-05 | End: 2024-11-06 | Stop reason: HOSPADM

## 2024-11-05 RX ORDER — BUPIVACAINE HYDROCHLORIDE 2.5 MG/ML
INJECTION, SOLUTION EPIDURAL; INFILTRATION; INTRACAUDAL AS NEEDED
Status: DISCONTINUED | OUTPATIENT
Start: 2024-11-05 | End: 2024-11-05 | Stop reason: SURG

## 2024-11-05 RX ORDER — LIDOCAINE HYDROCHLORIDE AND EPINEPHRINE 15; 5 MG/ML; UG/ML
INJECTION, SOLUTION EPIDURAL
Status: COMPLETED | OUTPATIENT
Start: 2024-11-05 | End: 2024-11-05

## 2024-11-05 RX ORDER — DIPHENHYDRAMINE HCL 50 MG/ML
25 VIAL (ML) INJECTION EVERY 6 HOURS PRN
Status: DISCONTINUED | OUTPATIENT
Start: 2024-11-05 | End: 2024-11-05

## 2024-11-05 RX ORDER — ALUMINUM HYDROXIDE, MAGNESIUM HYDROXIDE, AND SIMETHICONE 1200; 120; 1200 MG/30ML; MG/30ML; MG/30ML
30 SUSPENSION ORAL EVERY 4 HOURS PRN
Status: CANCELLED | OUTPATIENT
Start: 2024-11-05

## 2024-11-05 RX ORDER — LIDOCAINE HYDROCHLORIDE 20 MG/ML
INJECTION, SOLUTION EPIDURAL; INFILTRATION; INTRACAUDAL; PERINEURAL AS NEEDED
Status: DISCONTINUED | OUTPATIENT
Start: 2024-11-05 | End: 2024-11-05 | Stop reason: SURG

## 2024-11-05 RX ORDER — ONDANSETRON 4 MG/1
4 TABLET, ORALLY DISINTEGRATING ORAL EVERY 8 HOURS PRN
Status: CANCELLED | OUTPATIENT
Start: 2024-11-05

## 2024-11-05 RX ORDER — ACETAMINOPHEN 325 MG/1
650 TABLET ORAL EVERY 4 HOURS PRN
Status: CANCELLED | OUTPATIENT
Start: 2024-11-05 | End: 2025-02-03

## 2024-11-05 RX ADMIN — Medication 50 ML: at 02:09

## 2024-11-05 RX ADMIN — Medication 50 ML: at 06:47

## 2024-11-05 RX ADMIN — IBUPROFEN 600 MG: 600 TABLET, FILM COATED ORAL at 13:09

## 2024-11-05 RX ADMIN — IBUPROFEN 600 MG: 600 TABLET, FILM COATED ORAL at 23:43

## 2024-11-05 RX ADMIN — AMPICILLIN SODIUM 1 G: 1 INJECTION, POWDER, FOR SOLUTION INTRAMUSCULAR; INTRAVENOUS at 09:02

## 2024-11-05 RX ADMIN — BUPIVACAINE HYDROCHLORIDE 5 ML: 2.5 INJECTION, SOLUTION EPIDURAL; INFILTRATION; INTRACAUDAL; PERINEURAL at 09:04

## 2024-11-05 RX ADMIN — LIDOCAINE HYDROCHLORIDE,EPINEPHRINE BITARTRATE 5 ML: 15; .005 INJECTION, SOLUTION EPIDURAL; INFILTRATION; INTRACAUDAL; PERINEURAL at 02:05

## 2024-11-05 RX ADMIN — LIDOCAINE HYDROCHLORIDE 5 ML: 20 INJECTION, SOLUTION EPIDURAL; INFILTRATION; INTRACAUDAL; PERINEURAL at 09:04

## 2024-11-05 RX ADMIN — PRENATAL VIT W/ FE FUMARATE-FA TAB 27-0.8 MG 1 TABLET: 27-0.8 TAB at 13:09

## 2024-11-05 RX ADMIN — AMPICILLIN SODIUM 2 G: 2 INJECTION, POWDER, FOR SOLUTION INTRAMUSCULAR; INTRAVENOUS at 01:17

## 2024-11-05 RX ADMIN — OXYTOCIN 10 UNITS: 10 INJECTION, SOLUTION INTRAMUSCULAR; INTRAVENOUS at 12:31

## 2024-11-05 RX ADMIN — ACETAMINOPHEN 650 MG: 325 TABLET ORAL at 11:23

## 2024-11-05 RX ADMIN — SENNOSIDES AND DOCUSATE SODIUM 1 TABLET: 8.6; 5 TABLET ORAL at 21:12

## 2024-11-05 RX ADMIN — AMPICILLIN SODIUM 1 G: 1 INJECTION, POWDER, FOR SOLUTION INTRAMUSCULAR; INTRAVENOUS at 05:10

## 2024-11-05 RX ADMIN — SENNOSIDES AND DOCUSATE SODIUM 1 TABLET: 8.6; 5 TABLET ORAL at 13:09

## 2024-11-05 RX ADMIN — Medication 2 MILLI-UNITS/MIN: at 08:45

## 2024-11-05 NOTE — H&P
HPI     Adelia Contreras is a 34 y.o. female  at 39w0d with an estimated due date of 2024, by Last Menstrual Period who presents with irregular contractions    Last PO intake:   ,     ,      OB History:   OB History    Para Term  AB Living   2 1 1 0 0 1   SAB IAB Ectopic Multiple Live Births   0 0 0 0 1      # Outcome Date GA Lbr Audi/2nd Weight Sex Type Anes PTL Lv   2 Current            1 Term 22 40w1d 03:55 / 00:24 2.69 kg (5 lb 14.9 oz) F Vag-Spont EPI N ALINA      Name: BOY SCOTT,GIRL      Apgar1: 8  Apgar5: 9       Medical History:   Past Medical History:   Diagnosis Date    Abnormal ECG     Murmur     hx of    Scoliosis     hx of       Surgical History:   Past Surgical History   Procedure Laterality Date    Nasal septum surgery         Social History:   Social History     Socioeconomic History    Marital status:      Spouse name: None    Number of children: 1    Years of education: None    Highest education level: None   Occupational History    Occupation: Pyschologist   Tobacco Use    Smoking status: Never    Smokeless tobacco: Never   Vaping Use    Vaping status: Never Used   Substance and Sexual Activity    Alcohol use: Not Currently    Drug use: Never    Sexual activity: Yes     Comment:         Family History:   Family History   Problem Relation Name Age of Onset    No Known Problems Biological Mother      Transient ischemic attack Biological Father      Hemochromatosis Biological Father      No Known Problems Biological Brother         Allergies: Patient has no known allergies.    Prior to Admission medications    Medication Sig Start Date End Date Taking? Authorizing Provider   prenatal vit no.130-iron-folic 27 mg iron- 800 mcg tablet tablet Take 1 tablet by mouth daily.    Provider, [facility] Historical, MD       Review of Systems  Pertinent items are noted in HPI.    Objective     Vital Signs for the last 24 hours:       Latest cervical exam:  Cervical  Dilation (cm): 2  Cervical Effacement: 50  Fetal Station: -2  Method: sterile exam per provider (24 0350)    NST reactive    Labs:  ABO   Date Value Ref Range Status   2022 A  Final     Rh Factor   Date Value Ref Range Status   2022 Negative  Final     Rubella IgG Scr   Date Value Ref Range Status   2022 immune  Final     Strep Gp B Cult/DNA Probe   Date Value Ref Range Status   2022 No Beta Strep Isolated See table below, No growth at 18-24 hours, Probable contaminants, suggest recollection, No growth at 48 hours, No growth at 72 hours, No growth at 96 hours, No growth at 120 hours, No growth at 1 week, No growth at 2 weeks, No growth at 3 weeks, No gr... Final       Assessment/Plan     Adelia Contreras is a 34 y.o. female  at 39w0d admitted for observation     FHR: Category I  GBS: positive    Reeval in 2 hr for labor    Alexus Fragoso MD

## 2024-11-05 NOTE — PROGRESS NOTES
Labor and Delivery Progress Note    Subjective     Interval History: none.     Patient comfortable with epidural    Objective     Vital Signs for the last 24 hours:  Temp:  [36.7 °C (98 °F)-36.9 °C (98.4 °F)] 36.9 °C (98.4 °F)  Heart Rate:  [] 95  Resp:  [20] 20  BP: ()/(51-80) 109/79     Fetal Monitoring:  FHR Baseline: 150  FHR Variability: moderate  FHR Accelerations: present  FHR Decelerations: absent     Contraction Frequency: q 3-5    IUPC: no    Latest cervical exam:  Cervical Dilation (cm): 8  Cervical Effacement: 80  Fetal Station: -2  Method: sterile exam per provider (24 0750)           Current Facility-Administered Medications:     [COMPLETED] ampicillin IVPB 2 g in 100 mL NSS vial in bag, 2 g, intravenous, Once, Last Rate: 200 mL/hr at 24 0117, 2 g at 24 0117 **FOLLOWED BY** ampicillin IVPB 1 g in 100 mL NSS vial in bag, 1 g, intravenous, q4h INT, Alexus Fragoso MD, Last Rate: 200 mL/hr at 24 0510, 1 g at 24 0510    carboprost (HEMABATE) injection 250 mcg, 250 mcg, intramuscular, Once PRN, Alexus Fragoso MD    ePHEDrine sulfate-0.9%NaCl(PF) 50 mg/5 mL (10 mg/mL) injection 10 mg, 10 mg, intravenous, q10 min PRN, Gonzalo Muñoz DO    fentaNYL-ROPivacaine-NaCl (PF) 2 mcg/mL - 0.15% PCEA syringe, , epidural, Continuous, Gonzalo Muñoz DO, Last Rate: 10 mL/hr at 24 0647, 50 mL at 24 0647    methylergonovine (METHERGINE) injection 0.2 mg, 0.2 mg, intramuscular, Once PRN, Alexus Fragoso MD    miSOPROStoL (CYTOTEC) tablet 1,000 mcg, 1,000 mcg, rectal, Once PRN, Alexus Fragoso MD    oxytocin (PITOCIN) injection 10 Units, 10 Units, intramuscular, Once PRN, Alexus Fragoso MD    tranexamic acid (CYKLOKAPRON) 1000 mg/100 mL sodium chloride 0.7% (premix), 1,000 mg, intravenous, Once PRN, Alexus Fragoso MD    Assessment/Plan     Adelia Contreras is a 34 y.o. female  at 39w0d admitted with normal labor- arom and will add pitocin with rotation    FHR: Category I  GBS:  negative    Lawanda Michael, DO

## 2024-11-05 NOTE — PROGRESS NOTES
Pt more uncomfortable and making cervical change, now 5cm per RN exam  Admit for labor  Ampicillin for GBS ppx      10/2/24    BPD:      89.6  mm     G.Age:   36w 2d        94   %  HC:      316.1  mm     G.Age:   35w 4d        49   %  AC:      292.4  mm     G.Age:   33w 2d        28   %  FL:       66.1  mm     G.Age:   34w 0d        38   %  HUM:      58.6  mm     G.Age:   33w 6d        55   %  LV:       6.53  mm  CI:         80.19  %       70 - 86  FL/HC:       20.9  %       19.4 - 21.8  HC/AC:       1.08          0.96 - 1.11  FL/BPD:      73.8  %       71 - 87  FL/AC:       22.6  %       20 - 24     Est. FW:    2321   gm     5 lb 2 oz      44   %    Exp mng

## 2024-11-05 NOTE — ANESTHESIA PROCEDURE NOTES
Epidural Block    Patient location during procedure: OB  Start time: 11/5/2024 2:00 AM  End time: 11/5/2024 2:10 AM  Reason for block: labor analgesia requested by patient and obstetrician  Staffing  Performed: anesthesiologist   Anesthesiologist: Gonzalo Muñoz DO  Performed by: Gonzalo Muñoz DO  Authorized by: Gonzalo Muñoz DO    Preanesthetic Checklist  Completed: patient identified, surgical consent, pre-op evaluation, timeout performed, IV checked, risks and benefits discussed, monitors and equipment checked and sterile field maintained during procedure  Needle  Needle type: Tuohy   Needle gauge: 17 G  Needle length: 3.5 in  Needle insertion depth: 5 cm  Catheter type: Single orifice  Catheter size: 19 G  Catheter at skin depth: 9 cm  Test dose: negative and lidocaine 1.5% with epinephrine 1-to-200,000  Assessment  Sensory level: T10  Additional Notes  Procedure well tolerated. Vital signs stable. No paresthesia.  Analgesia satisfactory. Patients nurse to notify anesthesiologist if hemodynamically unstable.    Medications Administered -   lidocaine 1.5%-EPINEPHrine 1:200,000 PF (XYLOCAINE W/EPI) injection - epidural   5 mL - 11/5/2024 2:05:00 AM

## 2024-11-05 NOTE — ANESTHESIA PREPROCEDURE EVALUATION
Anesthesia ROS/MED HX    Anesthesia History - neg      Relevant Problems   No relevant active problems       Physical Exam    Airway   Mallampati: I   TM distance: <3 FB   Neck ROM: full  Cardiovascular - normal   Rhythm: regular   Rate: normalPulmonary - normal   clear to auscultation  Other Findings   Back - neg    landmarks identified        CBC Results         11/05/24 09/11/24 07/16/22     0115 1348 0124    WBC 9.21 8.2 9.33    RBC 3.63 3.45 3.75    HGB 10.9 10.5 11.8    HCT 33.3 32.1 35.0    MCV 91.7 93 93.3    MCH 30.0 30.4 31.5    MCHC 32.7 32.7 33.7     269 256             Anesthesia Plan    Plan: labor epidural       2 ASA  Anesthetic plan and risks discussed with: patient

## 2024-11-05 NOTE — LACTATION NOTE
PT has been BF independently on the R breast, some difficulty on the L. R nipple everts well but L is flatter. Reviewed positioning and ways to promote latch on this side. Baby latched briefly but would not maintain. Recommended  continuing to offer both sides with each feeding, starting with L until improvement is made.     Given hand pump to promote additional stimulation if baby struggles with L.

## 2024-11-05 NOTE — L&D DELIVERY NOTE
OB Vaginal Delivery Note    Delivery:t 9:47 AM  Patient:Adelia Contreras  :1990    Review the Delivery Report for details.     Delivery Details    Pre-Op Diagnosis: 1. 34 y.o.  intrauterine pregnancy at 39w0d with leyva gestation.  2. Normal labor  Skin tag right labia majora   Post-Op Diagnosis: 1.  viable female  same   Delivery Clinician:  mally   Delivery Assist;Delivery Nurse;Nursery Nurse     Delivery Type: Vaginal, Spontaneous   Labor Complications:  none   EBL: 100 mL   Anesthesia Type: Epidural   Placenta Delivery Spontaneous   Placenta Disposition: discarded   Additional Specimens Cord Blood    Skin tag      INFANT INFORMATION  Time of Birth:9:47 AM  Presentation: Vertex  Position:Middle,Occiput,Anterior  Cord:  ,Complications:None   Sex: female   Weight:       1 Minute 5 Minute 10 Minute   Apgar Totals:              Information for the patient's :  Diane, Girl [[id number]]      Cord Gas       None            Delivery Details:    Adelia Contreras is a 34 y.o.  at 39w0d gestation who presented to the hospital for Term pregnancy [Z34.90].  Her labor was spontaneous.  The patient progressed to fully dilated and pushed for  hours and   minutes.  A viable  female infant was delivered by Vaginal, Spontaneous from Middle,Occiput,Anterior.  The anterior and posterior shoulders delivered without difficulty followed by the remainder of the infant. A spontaneous cry was heard, and the infant appeared to be moving all 4 extremities. The cord was clamped and cut with   noted.  The placenta delivered spontaneously shortly thereafter.  Fundal massage was performed and the fundus was found to be firm, and lochia was within normal limits. The perineum, vagina, cervix were inspected, and the following lacerations were noted:      Episiotomy: None   Lacerations:   Perineal: None Repaired:      Periurethral:    Repaired:     Labial:    Repaired:     Sulcus:   Repaired:     Vaginal: Yes Repaired:     Cervical:    Repaired:        Any lacerations were repaired in the usual fashion using 3-0 Synthetic Suture suture. Excellent hemostasis was noted. At the completion of the case, sponge and needle counts were correct. The infant and patient were left in the delivery room in stable condition.     Attending Attestation: I was present and scrubbed for the entire procedure.    Lawanda Michael DO

## 2024-11-06 VITALS
BODY MASS INDEX: 27.49 KG/M2 | RESPIRATION RATE: 18 BRPM | TEMPERATURE: 97.7 F | SYSTOLIC BLOOD PRESSURE: 128 MMHG | OXYGEN SATURATION: 96 % | DIASTOLIC BLOOD PRESSURE: 83 MMHG | WEIGHT: 161 LBS | HEART RATE: 88 BPM | HEIGHT: 64 IN

## 2024-11-06 LAB
ABO + RH BLD: NORMAL
BLD GP AB SCN SERPL QL: NEGATIVE
CASE RPRT: NORMAL
CLINICAL INFO: NORMAL
D AG BLD QL: NEGATIVE
ERYTHROCYTE [DISTWIDTH] IN BLOOD BY AUTOMATED COUNT: 12.2 % (ref 11.7–14.4)
FETAL CELL SCN BLD QL ROSETTE: NEGATIVE
HCT VFR BLD AUTO: 35.8 % (ref 35–45)
HGB BLD-MCNC: 11.9 G/DL (ref 11.8–15.7)
LABORATORY COMMENT REPORT: NORMAL
MCH RBC QN AUTO: 31.6 PG (ref 28–33.2)
MCHC RBC AUTO-ENTMCNC: 33.2 G/DL (ref 32.2–35.5)
MCV RBC AUTO: 95 FL (ref 83–98)
PATH REPORT.FINAL DX SPEC: NORMAL
PATH REPORT.GROSS SPEC: NORMAL
PLATELET # BLD AUTO: 190 K/UL (ref 150–369)
PMV BLD AUTO: 11.3 FL (ref 9.4–12.3)
RBC # BLD AUTO: 3.77 M/UL (ref 3.93–5.22)
WBC # BLD AUTO: 10.11 K/UL (ref 3.8–10.5)

## 2024-11-06 PROCEDURE — 63700000 HC SELF-ADMINISTRABLE DRUG: Performed by: OBSTETRICS & GYNECOLOGY

## 2024-11-06 PROCEDURE — 36415 COLL VENOUS BLD VENIPUNCTURE: CPT | Performed by: OBSTETRICS & GYNECOLOGY

## 2024-11-06 PROCEDURE — 86901 BLOOD TYPING SEROLOGIC RH(D): CPT

## 2024-11-06 PROCEDURE — 85027 COMPLETE CBC AUTOMATED: CPT | Performed by: OBSTETRICS & GYNECOLOGY

## 2024-11-06 RX ORDER — IBUPROFEN 600 MG/1
600 TABLET ORAL EVERY 6 HOURS PRN
Qty: 30 TABLET | Refills: 0 | Status: SHIPPED | OUTPATIENT
Start: 2024-11-06 | End: 2025-02-10 | Stop reason: ALTCHOICE

## 2024-11-06 RX ORDER — AMOXICILLIN 250 MG
1 CAPSULE ORAL 2 TIMES DAILY
Qty: 177 TABLET | Refills: 0 | Status: SHIPPED | OUTPATIENT
Start: 2024-11-06 | End: 2025-02-10 | Stop reason: ALTCHOICE

## 2024-11-06 RX ORDER — LIDOCAINE HYDROCHLORIDE 10 MG/ML
0-30 INJECTION, SOLUTION EPIDURAL; INFILTRATION; INTRACAUDAL; PERINEURAL ONCE AS NEEDED
Status: DISCONTINUED | OUTPATIENT
Start: 2024-11-06 | End: 2024-11-06 | Stop reason: HOSPADM

## 2024-11-06 RX ADMIN — DIPHENHYDRAMINE HYDROCHLORIDE 25 MG: 25 CAPSULE ORAL at 08:41

## 2024-11-06 RX ADMIN — SENNOSIDES AND DOCUSATE SODIUM 1 TABLET: 8.6; 5 TABLET ORAL at 08:25

## 2024-11-06 RX ADMIN — IBUPROFEN 600 MG: 600 TABLET, FILM COATED ORAL at 06:04

## 2024-11-06 RX ADMIN — PRENATAL VIT W/ FE FUMARATE-FA TAB 27-0.8 MG 1 TABLET: 27-0.8 TAB at 08:25

## 2024-11-06 NOTE — PROGRESS NOTES
Social Work Maternity Screen    SW met with mom, FOB and baby at bedside. This is their 2nd child. Mom and FOB live together with their other daughter mary  home with running water, electricity, heat, etc. They have a  good support system. Mom denies any safety concerns. They are aware of how to add baby to insurance. They have all necessary baby supplies including a place for baby to sleep, diapers, care seat, etc. They will use [facility] as pediatrician.  Mom reports post partum with her first child but reports feeling well now. Denies any current depression/anxiety and D&A. SW provided post partum resources. And emotional support. Mom bonding well. Mom denies any needs for additional resources. No social barriers to d/c.    VOMITING/DIARRHEA/NAUSEA/ABDOMINAL PAIN

## 2024-11-06 NOTE — ANESTHESIA POSTPROCEDURE EVALUATION
Patient: Adelia JonesPyaneli    Procedure Summary       Date: 11/05/24 Room / Location:     Anesthesia Start: 0200 Anesthesia Stop: 0947    Procedure: Labor Analgesia Diagnosis:     Scheduled Providers:  Responsible Provider: Gonzalo Muñoz DO    Anesthesia Type: labor epidural ASA Status: 2            Anesthesia Type: labor epidural  PACU Vitals    No data found in the last 10 encounters.           Anesthesia Post Evaluation    Pain management: adequate  Patient participation: complete - patient participated  Level of consciousness: awake and alert  Cardiovascular status: acceptable  Airway Patency: adequate  Respiratory status: acceptable  Hydration status: acceptable  Anesthetic complications: no

## 2024-11-06 NOTE — DISCHARGE SUMMARY
Inpatient Discharge Summary    BRIEF OVERVIEW  Admitting Provider: Sally Agudelo MD  Discharge Provider: Sally Agudelo MD  Primary Care Physician at Discharge: Dickson Desai [phone]       Date of Delivery: 11/5/2024at 9:47 AM    Gestational Age:39w0d    Delivered By: Lawanda STANFORD Bernardini    Delivery Type: spontaneous vaginal delivery    Antepartum complications: AMA    Baby: Liveborn female, Apgars 8  /9  , 3.144 kg (6 lb 14.9 oz)    Anesthesia: Epidural    Intrapartum complications: None    Laceration: None    Feeding method: breast    Rh Immune globulin given: yes    Discharge Date: 11/6/2024      Plan:    Follow-up appointment with your doctors in 6 weeks, please call for an appointment    Norma Coffman MD

## 2024-11-06 NOTE — PROGRESS NOTES
"POST PARTUM NOTE    PPD#1    S:  Patient seen and examined.  The patient has no complaints at this time.  Pain is well controlled with Motrin.  Tolerated general diet.  Denies n/v/d/f/c. Lochia decreasing.  Breastfeeding infant well.  Has itching and redness of the vulva- likely 2/2 contact dermatitis- pt reports that she had a similar reaction with her last delivery    O:  Visit Vitals  /83 (BP Location: Left upper arm, Patient Position: Sitting)   Pulse 88   Temp 36.5 °C (97.7 °F) (Oral)   Resp 18   Ht 1.626 m (5' 4\")   Wt 73 kg (161 lb)   LMP 2024   SpO2 96%   Breastfeeding Yes   BMI 27.64 kg/m²     Physical Exam:  AAOx3, NAD  Abd: soft, appropriately tender to palpation  Fundus: firm below umbilicus and nontender  Ex: non-tender, no cyanosis    A/P: 34 y.o.  s/p   PPD#1    DISPO:Continue general postpartum care   Reviewed likely contact dermatitis and recommendation for hydrocortisone cream, benadyrl and ice- gave precautions on what to look out for    Pt desires discharge home today   Discharge home today with scripts and instructions    Norma Coffman MD      "

## 2024-11-06 NOTE — PLAN OF CARE
Problem: Adult Inpatient Plan of Care  Goal: Plan of Care Review  Outcome: Progressing  Flowsheets (Taken 11/6/2024 0038)  Progress: improving  Outcome Evaluation: VSS, bleeding WNL.  Pt taking motrin for pain control.  Breastfeeding infant independently.  Pt hoping for discharge today.  Plan of Care Reviewed With:   patient   spouse  Goal: Patient-Specific Goal (Individualized)  Outcome: Progressing  Goal: Absence of Hospital-Acquired Illness or Injury  Outcome: Progressing  Goal: Optimal Comfort and Wellbeing  Outcome: Progressing  Goal: Readiness for Transition of Care  Outcome: Progressing     Problem: Postpartum (Vaginal Delivery)  Goal: Successful Maternal Role Transition  Outcome: Progressing  Goal: Hemostasis  Outcome: Progressing  Goal: Absence of Infection Signs and Symptoms  Outcome: Progressing  Goal: Anesthesia/Sedation Recovery  Outcome: Progressing  Goal: Optimal Pain Control and Function  Outcome: Progressing  Goal: Effective Urinary Elimination  Outcome: Progressing     Problem: Breastfeeding  Goal: Effective Breastfeeding  Outcome: Progressing   Plan of Care Review  Plan of Care Reviewed With: patient, spouse  Progress: improving  Outcome Evaluation: VSS, bleeding WNL.  Pt taking motrin for pain control.  Breastfeeding infant independently.  Pt hoping for discharge today.

## 2024-11-22 ENCOUNTER — TRANSCRIBE ORDERS (OUTPATIENT)
Dept: SCHEDULING | Age: 34
End: 2024-11-22

## 2024-11-26 ENCOUNTER — HOSPITAL ENCOUNTER (OUTPATIENT)
Dept: RADIOLOGY | Facility: HOSPITAL | Age: 34
Discharge: HOME | End: 2024-11-26
Attending: EMERGENCY MEDICAL TECHNICIAN, BASIC
Payer: COMMERCIAL

## 2024-11-26 PROCEDURE — 76830 TRANSVAGINAL US NON-OB: CPT

## 2024-12-18 ENCOUNTER — TELEPHONE (OUTPATIENT)
Dept: PRIMARY CARE | Facility: CLINIC | Age: 34
End: 2024-12-18
Payer: COMMERCIAL

## 2024-12-18 NOTE — TELEPHONE ENCOUNTER
Confirmation - Referral [id number]  Effective: 11/26/2024     Expires: 02/24/2025  Active  Referred To  [facility]  Specialty: [facility] with Distinct Unit  Group NPI: [id number]  Provider ID: [id number]  Tax ID: [id number]  Individual Provider NPI: Not Available  This referral is valid at any location for the above group

## 2025-02-03 ENCOUNTER — TELEPHONE (OUTPATIENT)
Dept: PRIMARY CARE | Facility: CLINIC | Age: 35
End: 2025-02-03
Payer: COMMERCIAL

## 2025-02-03 NOTE — TELEPHONE ENCOUNTER
Call from pt to obtain referral for Dermatology. Pt provided referral info below.    NPI: [id number]  Dx code:  L70.0    [address]   [place] [id number]    [facility] informing pt that PCP is not correct with insurance. Pt asked to cb once PCP corrected.

## 2025-02-03 NOTE — TELEPHONE ENCOUNTER
Sw pt, she was informed that PCP was not correct in [place]. Pt will contact insurance and correct PCP.

## 2025-02-05 ENCOUNTER — APPOINTMENT (OUTPATIENT)
Dept: URBAN - METROPOLITAN AREA CLINIC 374 | Facility: CLINIC | Age: 35
Setting detail: DERMATOLOGY
End: 2025-02-05

## 2025-02-05 DIAGNOSIS — L81.4 OTHER MELANIN HYPERPIGMENTATION: ICD-10-CM

## 2025-02-05 DIAGNOSIS — Z80.8 FAMILY HISTORY OF MALIGNANT NEOPLASM OF OTHER ORGANS OR SYSTEMS: ICD-10-CM

## 2025-02-05 DIAGNOSIS — Z71.89 OTHER SPECIFIED COUNSELING: ICD-10-CM

## 2025-02-05 DIAGNOSIS — D22 MELANOCYTIC NEVI: ICD-10-CM

## 2025-02-05 DIAGNOSIS — D485 NEOPLASM OF UNCERTAIN BEHAVIOR OF SKIN: ICD-10-CM

## 2025-02-05 DIAGNOSIS — D18.0 HEMANGIOMA: ICD-10-CM

## 2025-02-05 PROBLEM — D48.5 NEOPLASM OF UNCERTAIN BEHAVIOR OF SKIN: Status: ACTIVE | Noted: 2025-02-05

## 2025-02-05 PROBLEM — D18.01 HEMANGIOMA OF SKIN AND SUBCUTANEOUS TISSUE: Status: ACTIVE | Noted: 2025-02-05

## 2025-02-05 PROBLEM — D22.5 MELANOCYTIC NEVI OF TRUNK: Status: ACTIVE | Noted: 2025-02-05

## 2025-02-05 PROCEDURE — ? TREATMENT REGIMEN

## 2025-02-05 PROCEDURE — 11102 TANGNTL BX SKIN SINGLE LES: CPT

## 2025-02-05 PROCEDURE — ? COUNSELING

## 2025-02-05 PROCEDURE — ? PHOTO-DOCUMENTATION

## 2025-02-05 PROCEDURE — 99213 OFFICE O/P EST LOW 20 MIN: CPT | Mod: 25

## 2025-02-05 PROCEDURE — ? BIOPSY BY SHAVE METHOD

## 2025-02-05 PROCEDURE — ? FULL BODY SKIN EXAM

## 2025-02-05 ASSESSMENT — LOCATION ZONE DERM
LOCATION ZONE: ARM
LOCATION ZONE: FACE
LOCATION ZONE: TRUNK

## 2025-02-05 ASSESSMENT — LOCATION DETAILED DESCRIPTION DERM
LOCATION DETAILED: RIGHT ANTERIOR DISTAL UPPER ARM
LOCATION DETAILED: UPPER STERNUM
LOCATION DETAILED: RIGHT ANTERIOR SHOULDER
LOCATION DETAILED: RIGHT MEDIAL FOREHEAD
LOCATION DETAILED: LEFT CENTRAL MALAR CHEEK
LOCATION DETAILED: EPIGASTRIC SKIN
LOCATION DETAILED: RIGHT INFERIOR MEDIAL UPPER BACK

## 2025-02-05 ASSESSMENT — LOCATION SIMPLE DESCRIPTION DERM
LOCATION SIMPLE: LEFT CHEEK
LOCATION SIMPLE: ABDOMEN
LOCATION SIMPLE: RIGHT UPPER ARM
LOCATION SIMPLE: RIGHT UPPER BACK
LOCATION SIMPLE: CHEST
LOCATION SIMPLE: RIGHT FOREHEAD
LOCATION SIMPLE: RIGHT SHOULDER

## 2025-02-05 NOTE — PROCEDURE: BIOPSY BY SHAVE METHOD
Detail Level: Detailed
Depth Of Biopsy: dermis
Was A Bandage Applied: Yes
Size Of Lesion In Cm: 0.2
X Size Of Lesion In Cm: 0
Biopsy Type: H and E
Biopsy Method: Dermablade
Anesthesia Type: 1% lidocaine without epinephrine
Hemostasis: Drysol
Wound Care: Petrolatum
Dressing: bandage
Destruction After The Procedure: No
Type Of Destruction Used: Curettage
Curettage Text: The wound bed was treated with curettage after the biopsy was performed.
Cryotherapy Text: The wound bed was treated with cryotherapy after the biopsy was performed.
Electrodesiccation Text: The wound bed was treated with electrodesiccation after the biopsy was performed.
Electrodesiccation And Curettage Text: The wound bed was treated with electrodesiccation and curettage after the biopsy was performed.
Silver Nitrate Text: The wound bed was treated with silver nitrate after the biopsy was performed.
Lab: 6
Lab Facility: 3
Medical Necessity Information: It is in your best interest to select a reason for this procedure from the list below. All of these items fulfill various CMS LCD requirements except the new and changing color options.
Consent: Written consent was obtained and risks were reviewed including but not limited to scarring, infection, bleeding, scabbing, incomplete removal, nerve damage and allergy to anesthesia.
Post-Care Instructions: I reviewed with the patient in detail post-care instructions. Patient is to keep the biopsy site dry overnight, and then apply bacitracin twice daily until healed. Patient may apply hydrogen peroxide soaks to remove any crusting.
Notification Instructions: Patient will be notified of biopsy results. However, patient instructed to call the office if not contacted within 2 weeks.
Billing Type: Third-Party Bill
Information: Selecting Yes will display possible errors in your note based on the variables you have selected. This validation is only offered as a suggestion for you. PLEASE NOTE THAT THE VALIDATION TEXT WILL BE REMOVED WHEN YOU FINALIZE YOUR NOTE. IF YOU WANT TO FAX A PRELIMINARY NOTE YOU WILL NEED TO TOGGLE THIS TO 'NO' IF YOU DO NOT WANT IT IN YOUR FAXED NOTE.

## 2025-02-05 NOTE — TELEPHONE ENCOUNTER
Confirmation - Referral [id number]  Effective: 02/05/2025     Expires: 05/06/2025  Active  Referred To  [facility]  Specialty: Dermatology  Group NPI: [id number]  Provider ID: [id number]  Tax ID: [id number]  This referral is valid at any location for the above group

## 2025-02-10 ENCOUNTER — TELEMEDICINE (OUTPATIENT)
Dept: PRIMARY CARE | Facility: CLINIC | Age: 35
End: 2025-02-10
Payer: COMMERCIAL

## 2025-02-10 DIAGNOSIS — J06.9 VIRAL UPPER RESPIRATORY TRACT INFECTION: Primary | ICD-10-CM

## 2025-02-10 PROCEDURE — 99214 OFFICE O/P EST MOD 30 MIN: CPT | Mod: 95 | Performed by: NURSE PRACTITIONER

## 2025-02-10 RX ORDER — AZITHROMYCIN 250 MG/1
TABLET, FILM COATED ORAL
Qty: 6 TABLET | Refills: 0 | Status: SHIPPED | OUTPATIENT
Start: 2025-02-10 | End: 2025-02-15

## 2025-02-10 NOTE — PROGRESS NOTES
Verification of Patient Location:  The patient affirms they are currently located in the following state: [place]    Are you in your home or a private residence? Yes    Request for Consent:    Audio and Video Encounter   Debbi, my name is KERRY Church.  Before we proceed, can you please verify your identification by telling me your full name and date of birth?  Can you tell me who is in the room with you?    You and I are about to have a telemedicine check-in or visit because you have requested it.  This is a live video-conference.  I am a real person, speaking to you in real time.  There is no one else with me on the video-conference. I am not recording this conversation and I am asking you not to record it.  This telemedicine visit will be billed to your health insurance or you, if you are self-insured.  You understand you will be responsible for any copayments or coinsurances that apply to your telemedicine visit.  Communication platform used for this encounter:  [employer] Video Visit (Epic Video Client)       Before starting our telemedicine visit, I am required to get your consent for this virtual check-in or visit by telemedicine. Do you consent?    Patient Response to Request for Consent:  Yes      Visit Documentation:  Subjective     Patient ID: Adelia Contreras is a 34 y.o. female.  1990      HPI    The following have been reviewed and updated as appropriate in this visit:  Patient complains of chest congestion, cough up green mucus and fatigue about 3 weeks.  Her kids were diagnosed with flu.  Previously patient was experiencing fever, chills and bodyaches.  She was taking Tylenol and Mucinex with not much improvement.  Patient is breast-feeding 3-month-old baby girl currently.        Review of Systems    Denies any fever, chills, shortness of breath, chest pain, dizziness, depression or anxiety  Assessment & Plan  Viral upper respiratory tract infection  Complains of chest congestion  and cough up green mucus x 3 weeks  Drink extra fluid  Will cover antibiotic with bacterial infection  Start Z-Pawel as directed  Hot tea with lemon and honey         Time Spent:  I spent 20 minutes on this date of service performing the following activities: obtaining history, entering orders, documenting, preparing for visit, obtaining / reviewing records, providing counseling and education, and coordinating care.

## 2025-03-13 ENCOUNTER — OFFICE VISIT (OUTPATIENT)
Dept: PRIMARY CARE | Facility: CLINIC | Age: 35
End: 2025-03-13
Payer: COMMERCIAL

## 2025-03-13 VITALS
HEART RATE: 101 BPM | HEIGHT: 64 IN | OXYGEN SATURATION: 99 % | TEMPERATURE: 97.3 F | WEIGHT: 132 LBS | SYSTOLIC BLOOD PRESSURE: 112 MMHG | RESPIRATION RATE: 16 BRPM | BODY MASS INDEX: 22.53 KG/M2 | DIASTOLIC BLOOD PRESSURE: 80 MMHG

## 2025-03-13 DIAGNOSIS — J06.9 URI, ACUTE: ICD-10-CM

## 2025-03-13 DIAGNOSIS — R10.30 LOWER ABDOMINAL PAIN: Primary | ICD-10-CM

## 2025-03-13 PROCEDURE — 99214 OFFICE O/P EST MOD 30 MIN: CPT | Performed by: STUDENT IN AN ORGANIZED HEALTH CARE EDUCATION/TRAINING PROGRAM

## 2025-03-13 PROCEDURE — 3008F BODY MASS INDEX DOCD: CPT | Performed by: STUDENT IN AN ORGANIZED HEALTH CARE EDUCATION/TRAINING PROGRAM

## 2025-03-13 ASSESSMENT — ENCOUNTER SYMPTOMS
RHINORRHEA: 1
ABDOMINAL PAIN: 1

## 2025-03-13 NOTE — ASSESSMENT & PLAN NOTE
Likely MSK given description and exacerbating factors    Ultrasound of the abdomen pending  Start PT if ultrasound is negative  Supportive care discussed in detail  If no improvement or worsening may need MRI to further assess musculature  Strict return to office/ER precautions discussed in detail, patient in agreement

## 2025-03-13 NOTE — PROGRESS NOTES
Subjective      Patient ID: Adelia Contreras is a 34 y.o. female here for the following:   immune system concerns (More cold sores, bacterial infections, had a boil. Would like to know if  )      HPI     #URI   #Recent infection   -Recently experiencing some congestion, runny nose, green mucus.  Recently completed a Z-Pawel for her upper respiratory infection 2 weeks ago.  Everyone in her house is currently sick and she believes her  was patient 0.  Unclear what he is currently dealing with, whether it is a virus or bacteria.  Today her symptoms are mild.  She is a bit concerned because she has had more cold sores than she is used to and she has had a couple upper respiratory infections during the season.  She would like to make sure there is no issues going on with her immune system.    # Left flank pain reports left anterior -lower abdominal discomfort that occurs mainly with stretching of the abdomen.  There is also some mild tenderness to palpation along the pelvis on the left.  Pain is mild.  No effect on her bowel or bladder.     Due for cervical cancer screening  Due for COVID, flu vaccinations  Up to date on all other screening exams/vaccinations       The following have been reviewed and updated as appropriate in this visit:      Allergies  Meds  Problems  Social History      Patient Active Problem List   Diagnosis    Palpitations    Term pregnancy    Annual physical exam    Chronic hand pain    31 weeks gestation of pregnancy    Lower abdominal pain    URI, acute    .    Social History     Tobacco Use    Smoking status: Never    Smokeless tobacco: Never   Vaping Use    Vaping status: Never Used   Substance Use Topics    Alcohol use: Not Currently    Drug use: Never       Allergies as of 03/13/2025    (No Known Allergies)         Current Outpatient Medications:     prenatal vit no.130-iron-folic 27 mg iron- 800 mcg tablet tablet, Take 1 tablet by mouth daily., Disp: , Rfl:       Review of  "systems as per HPI and below    PHQ-9 Results  No data recorded  No data recorded  No data recorded  No data recorded  No data recorded  No data recorded  No data recorded  No data recorded  No data recorded  No data recorded  No data recorded  No data recorded  No data recorded  No data recorded    [place] Suicide Severity Rating Scale  No data recorded  No data recorded  No data recorded  No data recorded  No data recorded  No data recorded  No data recorded           Review of Systems   HENT:  Positive for rhinorrhea.    Gastrointestinal:  Positive for abdominal pain.   All other systems reviewed and are negative.    Objective   Vitals:   Vitals:    03/13/25 1026   BP: 112/80   BP Location: Left upper arm   Patient Position: Sitting   Pulse: (!) 101   Resp: 16   Temp: 36.3 °C (97.3 °F)   TempSrc: Temporal   SpO2: 99%   Weight: 59.9 kg (132 lb)   Height: 1.626 m (5' 4\")     Body mass index is 22.66 kg/m².    Physical Exam  Constitutional:       General: She is not in acute distress.     Appearance: Normal appearance. She is not ill-appearing.   Eyes:      Conjunctiva/sclera: Conjunctivae normal.   Cardiovascular:      Rate and Rhythm: Normal rate and regular rhythm.      Pulses: Normal pulses.      Heart sounds: Normal heart sounds. No murmur heard.     No friction rub. No gallop.   Pulmonary:      Effort: Pulmonary effort is normal. No respiratory distress.      Breath sounds: Normal breath sounds. No stridor. No wheezing, rhonchi or rales.   Chest:      Chest wall: No tenderness.   Musculoskeletal:      Right lower leg: No edema.      Left lower leg: No edema.   Neurological:      General: No focal deficit present.      Mental Status: She is alert and oriented to person, place, and time.      Cranial Nerves: No cranial nerve deficit.      Sensory: No sensory deficit.      Motor: No weakness.      Coordination: Coordination normal.      Gait: Gait normal.      Deep Tendon Reflexes: Reflexes normal. "         ASSESSMENT & PLAN    Problem List Items Addressed This Visit       Lower abdominal pain - Primary     Likely MSK given description and exacerbating factors    Ultrasound of the abdomen pending  Start PT if ultrasound is negative  Supportive care discussed in detail  If no improvement or worsening may need MRI to further assess musculature  Strict return to office/ER precautions discussed in detail, patient in agreement         Relevant Orders    CBC and Differential    ULTRASOUND ABDOMEN COMPLETE    Ambulatory referral to Physical Therapy    URI, acute     Dealing with mild symptoms at this time.  Recommend starting a nondrowsy antihistamine as well as a steroid nasal spray such as Flonase.  If no improvement or worsening will likely need an antibiotic.  Strict return to office precautions discussed in detail, patient in agreement.            Orders Placed This Encounter   Procedures    ULTRASOUND ABDOMEN COMPLETE     Standing Status:   Future     Standing Expiration Date:   3/13/2026     Order Specific Question:   Release to patient     Answer:   Immediate [1]    CBC and Differential     Standing Status:   Future     Number of Occurrences:   1     Standing Expiration Date:   3/13/2026     Order Specific Question:   Release to patient     Answer:   Immediate [1]    Ambulatory referral to Physical Therapy     Standing Status:   Future     Standing Expiration Date:   3/13/2026     Referral Priority:   Routine     Referral Type:   Physical Therapy     Referral Reason:   Specialty Services Required     Number of Visits Requested:   10           _____________________  Dickson Desai MD  03/13/25

## 2025-03-13 NOTE — ASSESSMENT & PLAN NOTE
Dealing with mild symptoms at this time.  Recommend starting a nondrowsy antihistamine as well as a steroid nasal spray such as Flonase.  If no improvement or worsening will likely need an antibiotic.  Strict return to office precautions discussed in detail, patient in agreement.

## 2025-03-21 ENCOUNTER — RESULTS FOLLOW-UP (OUTPATIENT)
Dept: PRIMARY CARE | Facility: CLINIC | Age: 35
End: 2025-03-21

## 2025-03-21 LAB
BASOPHILS # BLD AUTO: 0 X10E3/UL (ref 0–0.2)
BASOPHILS NFR BLD AUTO: 1 %
EOSINOPHIL # BLD AUTO: 0.3 X10E3/UL (ref 0–0.4)
EOSINOPHIL NFR BLD AUTO: 5 %
ERYTHROCYTE [DISTWIDTH] IN BLOOD BY AUTOMATED COUNT: 12.1 % (ref 11.7–15.4)
HCT VFR BLD AUTO: 38.3 % (ref 34–46.6)
HGB BLD-MCNC: 12.6 G/DL (ref 11.1–15.9)
IMM GRANULOCYTES # BLD AUTO: 0 X10E3/UL (ref 0–0.1)
IMM GRANULOCYTES NFR BLD AUTO: 0 %
LYMPHOCYTES # BLD AUTO: 2.2 X10E3/UL (ref 0.7–3.1)
LYMPHOCYTES NFR BLD AUTO: 36 %
MCH RBC QN AUTO: 29.8 PG (ref 26.6–33)
MCHC RBC AUTO-ENTMCNC: 32.9 G/DL (ref 31.5–35.7)
MCV RBC AUTO: 91 FL (ref 79–97)
MONOCYTES # BLD AUTO: 0.4 X10E3/UL (ref 0.1–0.9)
MONOCYTES NFR BLD AUTO: 6 %
NEUTROPHILS # BLD AUTO: 3.2 X10E3/UL (ref 1.4–7)
NEUTROPHILS NFR BLD AUTO: 52 %
PLATELET # BLD AUTO: 290 X10E3/UL (ref 150–450)
RBC # BLD AUTO: 4.23 X10E6/UL (ref 3.77–5.28)
WBC # BLD AUTO: 6.1 X10E3/UL (ref 3.4–10.8)

## 2025-07-07 ENCOUNTER — TELEPHONE (OUTPATIENT)
Dept: PRIMARY CARE | Facility: CLINIC | Age: 35
End: 2025-07-07

## 2025-07-07 ENCOUNTER — OFFICE VISIT (OUTPATIENT)
Dept: PRIMARY CARE | Facility: CLINIC | Age: 35
End: 2025-07-07
Payer: COMMERCIAL

## 2025-07-07 ENCOUNTER — TELEPHONE (OUTPATIENT)
Dept: PRIMARY CARE | Facility: CLINIC | Age: 35
End: 2025-07-07
Payer: COMMERCIAL

## 2025-07-07 VITALS
BODY MASS INDEX: 22.3 KG/M2 | WEIGHT: 129.9 LBS | RESPIRATION RATE: 18 BRPM | OXYGEN SATURATION: 100 % | TEMPERATURE: 98.2 F | SYSTOLIC BLOOD PRESSURE: 110 MMHG | DIASTOLIC BLOOD PRESSURE: 72 MMHG | HEART RATE: 65 BPM

## 2025-07-07 DIAGNOSIS — R51.9 CHRONIC NONINTRACTABLE HEADACHE, UNSPECIFIED HEADACHE TYPE: ICD-10-CM

## 2025-07-07 DIAGNOSIS — G89.29 CHRONIC NONINTRACTABLE HEADACHE, UNSPECIFIED HEADACHE TYPE: ICD-10-CM

## 2025-07-07 DIAGNOSIS — R10.32 LLQ ABDOMINAL PAIN: Primary | ICD-10-CM

## 2025-07-07 PROCEDURE — 3008F BODY MASS INDEX DOCD: CPT | Performed by: FAMILY MEDICINE

## 2025-07-07 PROCEDURE — 99214 OFFICE O/P EST MOD 30 MIN: CPT | Performed by: FAMILY MEDICINE

## 2025-07-07 ASSESSMENT — ENCOUNTER SYMPTOMS
VOMITING: 0
ABDOMINAL PAIN: 1
RHINORRHEA: 0
FREQUENCY: 0
NAUSEA: 0
DIARRHEA: 0
COUGH: 0
HEADACHES: 1
WHEEZING: 0

## 2025-07-07 ASSESSMENT — PAIN SCALES - GENERAL: PAINLEVEL_OUTOF10: 4

## 2025-07-07 NOTE — TELEPHONE ENCOUNTER
34 y.o. female c/o localize pain left ovary pain scale 2 duration since 7/6, no bleeding , dull pain when it happens on/off. Dr. Forman did give her an u/s regarding this but did not follow through to complete it. I made her a sds appointment with Dr. Franklin for today at 5:15.

## 2025-07-07 NOTE — PROGRESS NOTES
Primary Care   [address] [place] [address] [address]   [place] [address] [phone]     Patient Name: Adelia Contreras  Age: 34 y.o.  Gender: female     HISTORY OF PRESENT ILLNESS   Adelia Contreras presents for Abdominal Pain  This is a new problem. The current episode started in the past 7 days. The onset quality is sudden. The problem occurs constantly. The problem has been gradually worsening. The pain is located in the LLQ. The pain is at a severity of 4/10. The quality of the pain is dull. The abdominal pain does not radiate. Associated symptoms include headaches. Pertinent negatives include no diarrhea, frequency, nausea or vomiting. The pain is aggravated by movement. The pain is relieved by Nothing.   Headache   This is a chronic problem. The current episode started more than 1 month ago. The problem occurs daily. The problem has been unchanged. The pain is located in the Right unilateral region. The pain does not radiate. The pain quality is not similar to prior headaches. The quality of the pain is described as throbbing. The pain is moderate. Associated symptoms include abdominal pain. Pertinent negatives include no coughing, nausea, rhinorrhea or vomiting. The symptoms are aggravated by work and emotional stress. She has tried acetaminophen for the symptoms. The treatment provided mild relief.         RELEVANT VISIT DATA   Office Visit on 03/13/2025   Component Date Value Ref Range Status    WBC 03/20/2025 6.1  3.4 - 10.8 x10E3/uL Final    RBC 03/20/2025 4.23  3.77 - 5.28 x10E6/uL Final    Hemoglobin 03/20/2025 12.6  11.1 - 15.9 g/dL Final    Hematocrit 03/20/2025 38.3  34.0 - 46.6 % Final    MCV 03/20/2025 91  79 - 97 fL Final    MCH 03/20/2025 29.8  26.6 - 33.0 pg Final    MCHC 03/20/2025 32.9  31.5 - 35.7 g/dL Final    RDW 03/20/2025 12.1  11.7 - 15.4 % Final    Platelets 03/20/2025 290  150 - 450 x10E3/uL Final    Neutrophils 03/20/2025 52  Not Estab. % Final    Lymphs 03/20/2025 36   Not Estab. % Final    Monocytes 03/20/2025 6  Not Estab. % Final    Eos 03/20/2025 5  Not Estab. % Final    Basos 03/20/2025 1  Not Estab. % Final    Neutrophils (Absolute) 03/20/2025 3.2  1.4 - 7.0 x10E3/uL Final    Lymphs (Absolute) 03/20/2025 2.2  0.7 - 3.1 x10E3/uL Final    Monocytes(Absolute) 03/20/2025 0.4  0.1 - 0.9 x10E3/uL Final    Eos (Absolute) 03/20/2025 0.3  0.0 - 0.4 x10E3/uL Final    Baso (Absolute) 03/20/2025 0.0  0.0 - 0.2 x10E3/uL Final    Immature Granulocytes 03/20/2025 0  Not Estab. % Final    Immature Grans (Abs) 03/20/2025 0.0  0.0 - 0.1 x10E3/uL Final         VITALS   Vitals:    07/07/25 1714   BP: 110/72   Pulse: 65   Resp: 18   Temp: 36.8 °C (98.2 °F)   SpO2: 100%   Weight: 58.9 kg (129 lb 14.4 oz)     Body mass index is 22.3 kg/m².     PAST MEDICAL AND SURGICAL HISTORY   Past Medical History:   Diagnosis Date    Abnormal ECG     Murmur     hx of    Scoliosis     hx of       Past Surgical History   Procedure Laterality Date    Nasal septum surgery          MEDICATIONS     Current Outpatient Medications:     prenatal vit no.130-iron-folic 27 mg iron- 800 mcg tablet tablet, Take 1 tablet by mouth daily., Disp: , Rfl:      ALLERGIES   Patient has no known allergies.     FAMILY HISTORY   Family History   Problem Relation Name Age of Onset    No Known Problems Biological Mother      Transient ischemic attack Biological Father      Hemochromatosis Biological Father      No Known Problems Biological Brother          SOCIAL HISTORY   Social History     Socioeconomic History    Marital status:     Number of children: 1   Occupational History    Occupation: Pyschologist   Tobacco Use    Smoking status: Never    Smokeless tobacco: Never   Vaping Use    Vaping status: Never Used   Substance and Sexual Activity    Alcohol use: Not Currently    Drug use: Never    Sexual activity: Yes     Comment:      Social Drivers of Health     Food Insecurity: No Food Insecurity (11/5/2024)    Hunger  Vital Sign     Worried About Running Out of Food in the Last Year: Never true     Ran Out of Food in the Last Year: Never true        REVIEW OF SYSTEMS   Review of Systems   HENT:  Negative for rhinorrhea.    Respiratory:  Negative for cough and wheezing.    Gastrointestinal:  Positive for abdominal pain. Negative for diarrhea, nausea and vomiting.   Genitourinary:  Negative for frequency.   Neurological:  Positive for headaches.         PHYSICAL EXAMINATION   Physical Exam  Constitutional:       General: She is not in acute distress.     Appearance: She is not ill-appearing or diaphoretic.   HENT:      Head: Normocephalic and atraumatic.      Nose: No congestion or rhinorrhea.   Eyes:      Extraocular Movements: Extraocular movements intact.      Conjunctiva/sclera: Conjunctivae normal.      Pupils: Pupils are equal, round, and reactive to light.   Cardiovascular:      Rate and Rhythm: Regular rhythm.   Pulmonary:      Effort: Pulmonary effort is normal.      Breath sounds: Normal breath sounds.   Abdominal:      General: Abdomen is flat. Bowel sounds are normal.      Palpations: Abdomen is soft.   Musculoskeletal:         General: Normal range of motion.   Skin:     General: Skin is warm and dry.   Neurological:      General: No focal deficit present.      Mental Status: She is alert.           ASSESSMENT AND PLAN     Assessment & Plan  LLQ abdominal pain  Uncontrolled, complains of worsening LLQ abdominal pain starting 1 day ago. Differential include diverticulitis, constipation, ovarian cyst, hernia. Will check CT abdomen/pelvis to evaluate for etiology of LLQ abdominal pain  Orders:    CT ABDOMEN PELVIS WITH AND WITHOUT IV CONTRAST; Future    Chronic nonintractable headache, unspecified headache type  Uncontrolled, complains of worsening right sided headaches occurring for more than the past 3 months, unimproved with OTC APAP/NSAIDS, unimproved with rest and time, check MRI            I spent 35 minutes on this  date of service performing the following activities: obtaining history, performing examination, entering orders, documenting, preparing for visit, providing counseling and education, communicating results, and coordinating care.      Oh Franklin DO  07/07/25  5:17 PM

## 2025-07-18 ENCOUNTER — HOSPITAL ENCOUNTER (OUTPATIENT)
Dept: RADIOLOGY | Age: 35
Discharge: HOME | End: 2025-07-18
Attending: FAMILY MEDICINE
Payer: COMMERCIAL

## 2025-07-18 DIAGNOSIS — R10.32 LLQ ABDOMINAL PAIN: ICD-10-CM

## 2025-07-18 PROCEDURE — 25500000 HC DRUGS/INCIDENT RAD: Performed by: FAMILY MEDICINE

## 2025-07-18 PROCEDURE — 74177 CT ABD & PELVIS W/CONTRAST: CPT

## 2025-07-18 PROCEDURE — 63600105 HC IODINE BASED CONTRAST: Mod: JZ | Performed by: FAMILY MEDICINE

## 2025-07-18 RX ORDER — IOPAMIDOL 755 MG/ML
100 INJECTION, SOLUTION INTRAVASCULAR
Status: DISCONTINUED | OUTPATIENT
Start: 2025-07-18 | End: 2025-07-18

## 2025-07-18 RX ORDER — IOPAMIDOL 755 MG/ML
100 INJECTION, SOLUTION INTRAVASCULAR
Status: COMPLETED | OUTPATIENT
Start: 2025-07-18 | End: 2025-07-18

## 2025-07-18 RX ADMIN — IOPAMIDOL 100 ML: 755 INJECTION, SOLUTION INTRAVENOUS at 10:50

## 2025-07-18 RX ADMIN — BARIUM SULFATE 900 ML: 20 SUSPENSION ORAL at 10:51

## 2025-07-18 NOTE — PROGRESS NOTES
"Adelia HIRSCH [facility]   [id number]    Your doctor has referred you for a CT ABDOMEN PELVIS W IV CONTRAST that requires the injection of an iodinated contrast material into your bloodstream. This iodinated contrast material, sometimes referred to as \"x-ray dye\" allows for better interpretation of the x-ray films or CT images and results in a more accurate interpretation of the examination.     Without the use of iodinated contrast (x-ray dye), the examination may be less informative and result in a suboptimal examination, and possibly a delay in diagnosis and, if needed, treatment.     The iodinated contrast material is given through a small needle or catheter placed into a vein, usually on the inside of the elbow, on the back of hand, or in a vein in the foot or lower leg.    The most common, though still rare, potential reaction to an intravenous contrast injection is an allergic-like reaction. Most allergic-like reactions are mild, though a small subset of people can have more severe reactions. Mild reactions include mild / scattered hives, itching, scratchy throat, sneezing and nasal congestion. More severe reactions include facial swelling, severe difficulty breathing, wheezing and anaphylactic shock. Those with a prior history allergic-like reaction to the same class of contrast media (such as CT contrast or MRI contrast) are at the highest risk for an allergic reaction.     If you believe you had an allergic reaction to contrast in the past, please let our staff know. We can determine if this increases your risk for a future reaction and provide steps to decrease the risk. This may delay your examination, but it decreases the risk of having a new and possibly more severe reaction to the contrast injection.    People with a history of prior allergic reactions to other substances (such as unrelated medications and food) and patients with a history of asthma have slightly increased risk for an allergic reaction " from contrast material when compared with the general population, but do not require to be pretreated prior to a contrast injection.    You should notify the physician, nurse or technologist if you have ever had any of these conditions or if you have any questions.

## 2025-07-18 NOTE — PROGRESS NOTES
"Adelia HIRSCH [facility]   [id number]    Your doctor has referred you for a CT ABDOMEN PELVIS W IV CONTRAST that requires the injection of an iodinated contrast material into your bloodstream. This iodinated contrast material, sometimes referred to as \"x-ray dye\" allows for better interpretation of the x-ray films or CT images and results in a more accurate interpretation of the examination.     Without the use of iodinated contrast (x-ray dye), the examination may be less informative and result in a suboptimal examination, and possibly a delay in diagnosis and, if needed, treatment.     The iodinated contrast material is given through a small needle or catheter placed into a vein, usually on the inside of the elbow, on the back of hand, or in a vein in the foot or lower leg.    The most common, though still rare, potential reaction to an intravenous contrast injection is an allergic-like reaction. Most allergic-like reactions are mild, though a small subset of people can have more severe reactions. Mild reactions include mild / scattered hives, itching, scratchy throat, sneezing and nasal congestion. More severe reactions include facial swelling, severe difficulty breathing, wheezing and anaphylactic shock. Those with a prior history allergic-like reaction to the same class of contrast media (such as CT contrast or MRI contrast) are at the highest risk for an allergic reaction.     If you believe you had an allergic reaction to contrast in the past, please let our staff know. We can determine if this increases your risk for a future reaction and provide steps to decrease the risk. This may delay your examination, but it decreases the risk of having a new and possibly more severe reaction to the contrast injection.    People with a history of prior allergic reactions to other substances (such as unrelated medications and food) and patients with a history of asthma have slightly increased risk for an allergic reaction " from contrast material when compared with the general population, but do not require to be pretreated prior to a contrast injection.    You should notify the physician, nurse or technologist if you have ever had any of these conditions or if you have any questions.

## 2025-07-28 ENCOUNTER — HOSPITAL ENCOUNTER (OUTPATIENT)
Dept: RADIOLOGY | Age: 35
Discharge: HOME | End: 2025-07-28
Attending: FAMILY MEDICINE
Payer: COMMERCIAL

## 2025-07-28 DIAGNOSIS — G89.29 CHRONIC NONINTRACTABLE HEADACHE, UNSPECIFIED HEADACHE TYPE: ICD-10-CM

## 2025-07-28 DIAGNOSIS — R51.9 CHRONIC NONINTRACTABLE HEADACHE, UNSPECIFIED HEADACHE TYPE: ICD-10-CM
